# Patient Record
Sex: FEMALE | Race: WHITE | NOT HISPANIC OR LATINO | Employment: FULL TIME | URBAN - METROPOLITAN AREA
[De-identification: names, ages, dates, MRNs, and addresses within clinical notes are randomized per-mention and may not be internally consistent; named-entity substitution may affect disease eponyms.]

---

## 2017-10-13 ENCOUNTER — APPOINTMENT (OUTPATIENT)
Dept: RADIOLOGY | Facility: MEDICAL CENTER | Age: 37
End: 2017-10-13
Attending: EMERGENCY MEDICINE
Payer: COMMERCIAL

## 2017-10-13 ENCOUNTER — HOSPITAL ENCOUNTER (EMERGENCY)
Facility: MEDICAL CENTER | Age: 37
End: 2017-10-13
Attending: EMERGENCY MEDICINE
Payer: COMMERCIAL

## 2017-10-13 VITALS
HEART RATE: 88 BPM | HEIGHT: 66 IN | SYSTOLIC BLOOD PRESSURE: 122 MMHG | TEMPERATURE: 97.8 F | BODY MASS INDEX: 32.6 KG/M2 | DIASTOLIC BLOOD PRESSURE: 74 MMHG | OXYGEN SATURATION: 99 % | WEIGHT: 202.82 LBS | RESPIRATION RATE: 16 BRPM

## 2017-10-13 DIAGNOSIS — N83.201 CYST OF RIGHT OVARY: ICD-10-CM

## 2017-10-13 DIAGNOSIS — R10.31 RIGHT LOWER QUADRANT ABDOMINAL PAIN: ICD-10-CM

## 2017-10-13 LAB
ALBUMIN SERPL BCP-MCNC: 3.4 G/DL (ref 3.2–4.9)
ALBUMIN/GLOB SERPL: 1.7 G/DL
ALP SERPL-CCNC: 54 U/L (ref 30–99)
ALT SERPL-CCNC: 16 U/L (ref 2–50)
ANION GAP SERPL CALC-SCNC: 8 MMOL/L (ref 0–11.9)
APPEARANCE UR: CLEAR
AST SERPL-CCNC: 14 U/L (ref 12–45)
BASOPHILS # BLD AUTO: 0.3 % (ref 0–1.8)
BASOPHILS # BLD: 0.03 K/UL (ref 0–0.12)
BILIRUB SERPL-MCNC: 0.4 MG/DL (ref 0.1–1.5)
BILIRUB UR QL STRIP.AUTO: NEGATIVE
BUN SERPL-MCNC: 8 MG/DL (ref 8–22)
CALCIUM SERPL-MCNC: 8.5 MG/DL (ref 8.5–10.5)
CHLORIDE SERPL-SCNC: 108 MMOL/L (ref 96–112)
CO2 SERPL-SCNC: 21 MMOL/L (ref 20–33)
COLOR UR: YELLOW
CREAT SERPL-MCNC: 0.75 MG/DL (ref 0.5–1.4)
CULTURE IF INDICATED INDCX: NO UA CULTURE
EOSINOPHIL # BLD AUTO: 0.18 K/UL (ref 0–0.51)
EOSINOPHIL NFR BLD: 1.9 % (ref 0–6.9)
ERYTHROCYTE [DISTWIDTH] IN BLOOD BY AUTOMATED COUNT: 39.5 FL (ref 35.9–50)
GFR SERPL CREATININE-BSD FRML MDRD: >60 ML/MIN/1.73 M 2
GLOBULIN SER CALC-MCNC: 2 G/DL (ref 1.9–3.5)
GLUCOSE SERPL-MCNC: 86 MG/DL (ref 65–99)
GLUCOSE UR STRIP.AUTO-MCNC: NEGATIVE MG/DL
HCG SERPL QL: NEGATIVE
HCG UR QL: NEGATIVE
HCT VFR BLD AUTO: 47.5 % (ref 37–47)
HGB BLD-MCNC: 16.7 G/DL (ref 12–16)
IMM GRANULOCYTES # BLD AUTO: 0.04 K/UL (ref 0–0.11)
IMM GRANULOCYTES NFR BLD AUTO: 0.4 % (ref 0–0.9)
KETONES UR STRIP.AUTO-MCNC: ABNORMAL MG/DL
LEUKOCYTE ESTERASE UR QL STRIP.AUTO: NEGATIVE
LIPASE SERPL-CCNC: 59 U/L (ref 11–82)
LYMPHOCYTES # BLD AUTO: 2.41 K/UL (ref 1–4.8)
LYMPHOCYTES NFR BLD: 25.1 % (ref 22–41)
MCH RBC QN AUTO: 31.3 PG (ref 27–33)
MCHC RBC AUTO-ENTMCNC: 35.2 G/DL (ref 33.6–35)
MCV RBC AUTO: 89 FL (ref 81.4–97.8)
MICRO URNS: ABNORMAL
MONOCYTES # BLD AUTO: 0.61 K/UL (ref 0–0.85)
MONOCYTES NFR BLD AUTO: 6.3 % (ref 0–13.4)
NEUTROPHILS # BLD AUTO: 6.35 K/UL (ref 2–7.15)
NEUTROPHILS NFR BLD: 66 % (ref 44–72)
NITRITE UR QL STRIP.AUTO: NEGATIVE
NRBC # BLD AUTO: 0 K/UL
NRBC BLD AUTO-RTO: 0 /100 WBC
PH UR STRIP.AUTO: 5.5 [PH]
PLATELET # BLD AUTO: 209 K/UL (ref 164–446)
PMV BLD AUTO: 12.3 FL (ref 9–12.9)
POTASSIUM SERPL-SCNC: 3.7 MMOL/L (ref 3.6–5.5)
PROT SERPL-MCNC: 5.4 G/DL (ref 6–8.2)
PROT UR QL STRIP: NEGATIVE MG/DL
RBC # BLD AUTO: 5.34 M/UL (ref 4.2–5.4)
RBC UR QL AUTO: NEGATIVE
SODIUM SERPL-SCNC: 137 MMOL/L (ref 135–145)
SP GR UR REFRACTOMETRY: 1.01
SP GR UR STRIP.AUTO: 1.01
UROBILINOGEN UR STRIP.AUTO-MCNC: 0.2 MG/DL
WBC # BLD AUTO: 9.6 K/UL (ref 4.8–10.8)

## 2017-10-13 PROCEDURE — 84703 CHORIONIC GONADOTROPIN ASSAY: CPT

## 2017-10-13 PROCEDURE — 80053 COMPREHEN METABOLIC PANEL: CPT

## 2017-10-13 PROCEDURE — 74177 CT ABD & PELVIS W/CONTRAST: CPT

## 2017-10-13 PROCEDURE — 85025 COMPLETE CBC W/AUTO DIFF WBC: CPT

## 2017-10-13 PROCEDURE — 99284 EMERGENCY DEPT VISIT MOD MDM: CPT

## 2017-10-13 PROCEDURE — 81003 URINALYSIS AUTO W/O SCOPE: CPT

## 2017-10-13 PROCEDURE — 81025 URINE PREGNANCY TEST: CPT

## 2017-10-13 PROCEDURE — 83690 ASSAY OF LIPASE: CPT

## 2017-10-13 PROCEDURE — 700117 HCHG RX CONTRAST REV CODE 255: Performed by: EMERGENCY MEDICINE

## 2017-10-13 RX ADMIN — IOHEXOL 100 ML: 350 INJECTION, SOLUTION INTRAVENOUS at 14:29

## 2017-10-13 NOTE — DISCHARGE INSTRUCTIONS
"Ovarian Cyst  An ovarian cyst is a fluid-filled sac that forms on an ovary. The ovaries are small organs that produce eggs in women. Various types of cysts can form on the ovaries. Most are not cancerous. Many do not cause problems, and they often go away on their own. Some may cause symptoms and require treatment. Common types of ovarian cysts include:  · Functional cysts--These cysts may occur every month during the menstrual cycle. This is normal. The cysts usually go away with the next menstrual cycle if the woman does not get pregnant. Usually, there are no symptoms with a functional cyst.  · Endometrioma cysts--These cysts form from the tissue that lines the uterus. They are also called \"chocolate cysts\" because they become filled with blood that turns brown. This type of cyst can cause pain in the lower abdomen during intercourse and with your menstrual period.  · Cystadenoma cysts--This type develops from the cells on the outside of the ovary. These cysts can get very big and cause lower abdomen pain and pain with intercourse. This type of cyst can twist on itself, cut off its blood supply, and cause severe pain. It can also easily rupture and cause a lot of pain.  · Dermoid cysts--This type of cyst is sometimes found in both ovaries. These cysts may contain different kinds of body tissue, such as skin, teeth, hair, or cartilage. They usually do not cause symptoms unless they get very big.  · Theca lutein cysts--These cysts occur when too much of a certain hormone (human chorionic gonadotropin) is produced and overstimulates the ovaries to produce an egg. This is most common after procedures used to assist with the conception of a baby (in vitro fertilization).  CAUSES   · Fertility drugs can cause a condition in which multiple large cysts are formed on the ovaries. This is called ovarian hyperstimulation syndrome.  · A condition called polycystic ovary syndrome can cause hormonal imbalances that can lead to " nonfunctional ovarian cysts.  SIGNS AND SYMPTOMS   Many ovarian cysts do not cause symptoms. If symptoms are present, they may include:  · Pelvic pain or pressure.  · Pain in the lower abdomen.  · Pain during sexual intercourse.  · Increasing girth (swelling) of the abdomen.  · Abnormal menstrual periods.  · Increasing pain with menstrual periods.  · Stopping having menstrual periods without being pregnant.  DIAGNOSIS   These cysts are commonly found during a routine or annual pelvic exam. Tests may be ordered to find out more about the cyst. These tests may include:  · Ultrasound.  · X-ray of the pelvis.  · CT scan.  · MRI.  · Blood tests.  TREATMENT   Many ovarian cysts go away on their own without treatment. Your health care provider may want to check your cyst regularly for 2-3 months to see if it changes. For women in menopause, it is particularly important to monitor a cyst closely because of the higher rate of ovarian cancer in menopausal women. When treatment is needed, it may include any of the following:  · A procedure to drain the cyst (aspiration). This may be done using a long needle and ultrasound. It can also be done through a laparoscopic procedure. This involves using a thin, lighted tube with a tiny camera on the end (laparoscope) inserted through a small incision.  · Surgery to remove the whole cyst. This may be done using laparoscopic surgery or an open surgery involving a larger incision in the lower abdomen.  · Hormone treatment or birth control pills. These methods are sometimes used to help dissolve a cyst.  HOME CARE INSTRUCTIONS   · Only take over-the-counter or prescription medicines as directed by your health care provider.  · Follow up with your health care provider as directed.  · Get regular pelvic exams and Pap tests.  SEEK MEDICAL CARE IF:   · Your periods are late, irregular, or painful, or they stop.  · Your pelvic pain or abdominal pain does not go away.  · Your abdomen becomes  larger or swollen.  · You have pressure on your bladder or trouble emptying your bladder completely.  · You have pain during sexual intercourse.  · You have feelings of fullness, pressure, or discomfort in your stomach.  · You lose weight for no apparent reason.  · You feel generally ill.  · You become constipated.  · You lose your appetite.  · You develop acne.  · You have an increase in body and facial hair.  · You are gaining weight, without changing your exercise and eating habits.  · You think you are pregnant.  SEEK IMMEDIATE MEDICAL CARE IF:   · You have increasing abdominal pain.  · You feel sick to your stomach (nauseous), and you throw up (vomit).  · You develop a fever that comes on suddenly.  · You have abdominal pain during a bowel movement.  · Your menstrual periods become heavier than usual.  MAKE SURE YOU:  · Understand these instructions.  · Will watch your condition.  · Will get help right away if you are not doing well or get worse.     This information is not intended to replace advice given to you by your health care provider. Make sure you discuss any questions you have with your health care provider.     Document Released: 12/18/2006 Document Revised: 12/23/2014 Document Reviewed: 08/25/2014  [employer] Interactive Patient Education ©2016 [employer]

## 2017-10-13 NOTE — ED NOTES
Chief Complaint   Patient presents with   • Abdominal Pain     started wednesday afternoon   • Nausea     36 y/o female ambulate to triage   Pt denies pregnancy denies dysuria

## 2017-10-13 NOTE — ED PROVIDER NOTES
"ED Provider Note    Scribed for Dr. Traci Briceno M.D. by Tacho Downey. 10/13/2017, 11:01 AM.    Primary care provider: SENAIT Rivera  Means of arrival: Walk-in  History obtained from: Patient  History limited by: None    CHIEF COMPLAINT  Chief Complaint   Patient presents with   • Abdominal Pain     started wednesday afternoon   • Nausea       HPI  Alexsandra Mims is a 37 y.o. female who presents to the Emergency Department due to waxing and waning right lower abdominal pain, that occasional radiates to her right lower back, onset Wednesday afternoon. The patient states that it initially \"felt like cramps\" but the pain has been progressively worsening. Per patient, standing up, coughing, and sneezing exacerbates her pain. She states that when she coughed it \"felt like someone trying to murder [her] from the inside\". Currently, her pain is a 4 or 5/10 in severity. The patient does not report any alleviating factors. Denies pain with bending her legs or palpation. She has had associated symptoms of a loss of appetite but denies nausea, vomiting, diarrhea, fever, dysuria, difficulties urinating, or vaginal discharge. Her LMP was on 9/30/17.     REVIEW OF SYSTEMS  Pertinent positives include right lower quadrant pain, right lower back pain, and a loss of appetite. Pertinent negatives include no  nausea, vomiting, diarrhea, fever, dysuria, difficulties urinating, or vaginal discharge. All other systems reviewed and negative.  C    PAST MEDICAL HISTORY   has a past medical history of Asthma and Tobacco use.    SURGICAL HISTORY   has a past surgical history that includes primary c section.    SOCIAL HISTORY  Social History   Substance Use Topics   • Smoking status: Former Smoker     Packs/day: 1.00     Years: 20.00     Types: Cigarettes   • Smokeless tobacco: Never Used   • Alcohol use 0.0 oz/week      Comment: RARELY      History   Drug Use No       FAMILY HISTORY  Family History   Problem Relation Age " "of Onset   • Heart Disease Mother    • Heart Attack Father    • Hypertension Sister    • Hypertension Brother        CURRENT MEDICATIONS  No current facility-administered medications on file prior to encounter.      Current Outpatient Prescriptions on File Prior to Encounter   Medication Sig Dispense Refill   • ibuprofen (MOTRIN) 800 MG Tab Take 1 Tab by mouth every 8 hours as needed for Moderate Pain. 50 Tab 0   • ondansetron (ZOFRAN) 4 MG Tab tablet Take 1 Tab by mouth every 6 hours as needed for Nausea/Vomiting. 8 Each 0   • metronidazole (FLAGYL) 500 MG Tab Take 500 mg by mouth every 6 hours.     • ibuprofen (MOTRIN) 800 MG Tab Take 1 Tab by mouth every 8 hours as needed for Moderate Pain. 40 Tab 0   • tramadol (ULTRAM) 50 MG Tab Take 1 Tab by mouth every 6 hours as needed for Moderate Pain. 24 Tab 0   • albuterol (PROAIR HFA) 108 (90 BASE) MCG/ACT Aero Soln inhalation aerosol 2 Puffs by Nebulization route.         ALLERGIES  Allergies   Allergen Reactions   • Amoxicillin    • Clindamycin Rash     Rash \"like poison ivy on my arms\"   • Darvocet [Propoxyphene N-Apap]    • Morphine Rash   • Norco [Hydrocodone-Acetaminophen]    • Pcn [Penicillins]      All cillins   • Sulfa Drugs    • Valium Swelling       PHYSICAL EXAM  VITAL SIGNS: /74   Pulse 99   Temp 36.6 °C (97.8 °F) (Temporal)   Resp 16   Ht 1.676 m (5' 6\")   Wt 92 kg (202 lb 13.2 oz)   SpO2 97%   BMI 32.74 kg/m²   Constitutional: Alert in no apparent distress.  HENT: No signs of trauma, Bilateral external ears normal, Nose normal.   Eyes: Pupils are equal and reactive, Conjunctiva normal, Non-icteric.   Neck: Normal range of motion, No tenderness, Supple, No stridor.   Cardiovascular: Regular rate and rhythm, no murmurs.   Thorax & Lungs: Normal breath sounds, No respiratory distress, No wheezing, No chest tenderness.   Abdomen: Mild right upper quadrant tenderness with deep inspiration but no Mcburney's point tenderness, Bowel sounds normal, " Soft, No masses, No peritoneal signs.  Skin: Warm, Dry, No erythema, No rash.   Back: No bony tenderness, No CVA tenderness.   Musculoskeletal:  No major deformities noted.   Neurologic: Alert, moving all extremities without difficulty, no focal deficits.      LABS  Results for orders placed or performed during the hospital encounter of 10/13/17   URINALYSIS,CULTURE IF INDICATED   Result Value Ref Range    Color Yellow     Character Clear     Specific Gravity 1.007 <1.035    Ph 5.5 5.0 - 8.0    Glucose Negative Negative mg/dL    Ketones Trace (A) Negative mg/dL    Protein Negative Negative mg/dL    Bilirubin Negative Negative    Urobilinogen, Urine 0.2 Negative    Nitrite Negative Negative    Leukocyte Esterase Negative Negative    Occult Blood Negative Negative    Micro Urine Req see below     Culture Indicated No UA Culture   BETA-HCG QUALITATIVE URINE   Result Value Ref Range    Beta-Hcg Urine Negative Negative   REFRACTOMETER SG   Result Value Ref Range    Specific Gravity 1.007    CBC WITH DIFFERENTIAL   Result Value Ref Range    WBC 9.6 4.8 - 10.8 K/uL    RBC 5.34 4.20 - 5.40 M/uL    Hemoglobin 16.7 (H) 12.0 - 16.0 g/dL    Hematocrit 47.5 (H) 37.0 - 47.0 %    MCV 89.0 81.4 - 97.8 fL    MCH 31.3 27.0 - 33.0 pg    MCHC 35.2 (H) 33.6 - 35.0 g/dL    RDW 39.5 35.9 - 50.0 fL    Platelet Count 209 164 - 446 K/uL    MPV 12.3 9.0 - 12.9 fL    Neutrophils-Polys 66.00 44.00 - 72.00 %    Lymphocytes 25.10 22.00 - 41.00 %    Monocytes 6.30 0.00 - 13.40 %    Eosinophils 1.90 0.00 - 6.90 %    Basophils 0.30 0.00 - 1.80 %    Immature Granulocytes 0.40 0.00 - 0.90 %    Nucleated RBC 0.00 /100 WBC    Neutrophils (Absolute) 6.35 2.00 - 7.15 K/uL    Lymphs (Absolute) 2.41 1.00 - 4.80 K/uL    Monos (Absolute) 0.61 0.00 - 0.85 K/uL    Eos (Absolute) 0.18 0.00 - 0.51 K/uL    Baso (Absolute) 0.03 0.00 - 0.12 K/uL    Immature Granulocytes (abs) 0.04 0.00 - 0.11 K/uL    NRBC (Absolute) 0.00 K/uL   COMP METABOLIC PANEL   Result Value  Ref Range    Sodium 137 135 - 145 mmol/L    Potassium 3.7 3.6 - 5.5 mmol/L    Chloride 108 96 - 112 mmol/L    Co2 21 20 - 33 mmol/L    Anion Gap 8.0 0.0 - 11.9    Glucose 86 65 - 99 mg/dL    Bun 8 8 - 22 mg/dL    Creatinine 0.75 0.50 - 1.40 mg/dL    Calcium 8.5 8.5 - 10.5 mg/dL    AST(SGOT) 14 12 - 45 U/L    ALT(SGPT) 16 2 - 50 U/L    Alkaline Phosphatase 54 30 - 99 U/L    Total Bilirubin 0.4 0.1 - 1.5 mg/dL    Albumin 3.4 3.2 - 4.9 g/dL    Total Protein 5.4 (L) 6.0 - 8.2 g/dL    Globulin 2.0 1.9 - 3.5 g/dL    A-G Ratio 1.7 g/dL   LIPASE   Result Value Ref Range    Lipase 59 11 - 82 U/L   HCG QUAL SERUM   Result Value Ref Range    Beta-Hcg Qualitative Serum Negative Negative   ESTIMATED GFR   Result Value Ref Range    GFR If African American >60 >60 mL/min/1.73 m 2    GFR If Non African American >60 >60 mL/min/1.73 m 2     All labs reviewed by me.    RADIOLOGY  CT-ABDOMEN-PELVIS WITH   Final Result      1.  Normal-appearing appendix. No acute abnormality of the abdomen or pelvis.   2.  2.6 cm right ovarian cyst.        The radiologist's interpretation of all radiological studies have been reviewed by me.    COURSE & MEDICAL DECISION MAKING  Pertinent Labs & Imaging studies reviewed. (See chart for details)    Differential diagnoses include but are not limited to: kidney stone, urinary tract infection, appendicitis, and biliary colic, ovarian cyst.     11:01 AM - Patient seen and examined at bedside. Ordered urinalysis, beta-HCG qualitative urine, refractometer SG, lipase, CMP, and CBC with differential to evaluate her symptoms. I explained that since she is not tender in her right lower quadrant, I am Less concerned about acute appendicitis. However laboratory and imaging will be performed to evaluate.    11:42 AM- Ordered estimated GFR to further evaluate.     12:40 PM- Reviewed labs, which are overall unremarkable.     12:44 PM- Ordered CT abdomen/pelvis     12:45 PM- Recheck: patient is resting in bed  "comfortably. I explained that the patient's labs are overall unremarkable. She has no elevated white blood cell count or left shift concerning for intra-abdominal abscess. However given this patient does feel something significantly different I am concerned for intra-abdominal process. Thus, we will order a CT scan to further investigate. Patient understands and agrees.     2:55 PM- Reviewed CT scan, which showed a 2.6 cm right ovarian cyst.     3:04 PM - Re-examined; The patient is resting in bed comfortably. I discussed her above findings and plans for discharge. She was instructed to return to the ED for worsening pain, vomiting, fever, or other concerns. Patient understands and agrees. Her vitals prior to discharge are: /74   Pulse 88   Temp 36.6 °C (97.8 °F) (Temporal)   Resp 16   Ht 1.676 m (5' 6\")   Wt 92 kg (202 lb 13.2 oz)   SpO2 99%   BMI 32.74 kg/m²       The patient will return for new or worsening symptoms and is stable at the time of discharge. Patient was given return precautions. Patient and/or family member verbalizes understanding and will comply.    The patient is referred to a primary physician for blood pressure management, diabetic screening, and for all other preventive health concerns.    DISPOSITION:  Patient will be discharged home in stable condition.    FOLLOW UP:  SENAIT Rivera  [address] [place] [address] [phone]    Schedule an appointment as soon as possible for a visit      [facility], Emergency Dept  [address] [address] [phone]    Worsening pain, vomiting, fever, or other concerns    Robinson Swartz M.D.  [address] [place] [address] [address]  [facility] [address]  [phone]      GYN follow up      OUTPATIENT MEDICATIONS:  Discharge Medication List as of 10/13/2017  3:08 PM          FINAL IMPRESSION  1. Cyst of right ovary    2. Right lower quadrant abdominal pain         This dictation has been created using voice " recognition software and/or scribes. The accuracy of the dictation is limited by the abilities of the software and the expertise of the scribes. I expect there may be some errors of grammar and possibly content. I made every attempt to manually correct the errors within my dictation. However, errors related to voice recognition software and/or scribes may still exist and should be interpreted within the appropriate context.     I, Tacho Downey (Scribe), am scribing for, and in the presence of, Traci Briceno M.D..    Electronically signed by: Tacho Downey (Scribe), 10/13/2017    I, Traci Briceno M.D. personally performed the services described in this documentation, as scribed by Tacho Downey in my presence, and it is both accurate and complete.      The note accurately reflects work and decisions made by me.  Traci Briceno  10/13/2017  6:31 PM

## 2017-10-14 ENCOUNTER — PATIENT OUTREACH (OUTPATIENT)
Dept: HEALTH INFORMATION MANAGEMENT | Facility: OTHER | Age: 37
End: 2017-10-14

## 2018-09-04 ENCOUNTER — HOSPITAL ENCOUNTER (EMERGENCY)
Facility: MEDICAL CENTER | Age: 38
End: 2018-09-04
Attending: EMERGENCY MEDICINE
Payer: COMMERCIAL

## 2018-09-04 ENCOUNTER — APPOINTMENT (OUTPATIENT)
Dept: RADIOLOGY | Facility: MEDICAL CENTER | Age: 38
End: 2018-09-04
Attending: EMERGENCY MEDICINE
Payer: COMMERCIAL

## 2018-09-04 VITALS
WEIGHT: 210.54 LBS | RESPIRATION RATE: 18 BRPM | OXYGEN SATURATION: 95 % | HEART RATE: 60 BPM | SYSTOLIC BLOOD PRESSURE: 122 MMHG | BODY MASS INDEX: 33.84 KG/M2 | HEIGHT: 66 IN | DIASTOLIC BLOOD PRESSURE: 79 MMHG | TEMPERATURE: 97.8 F
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
## 2018-09-04 DIAGNOSIS — S16.1XXA STRAIN OF NECK MUSCLE, INITIAL ENCOUNTER: ICD-10-CM

## 2018-09-04 DIAGNOSIS — S00.83XA CONTUSION OF FACE, INITIAL ENCOUNTER: ICD-10-CM

## 2018-09-04 PROCEDURE — A9270 NON-COVERED ITEM OR SERVICE: HCPCS | Mod: EDC | Performed by: EMERGENCY MEDICINE

## 2018-09-04 PROCEDURE — 72125 CT NECK SPINE W/O DYE: CPT

## 2018-09-04 PROCEDURE — 700102 HCHG RX REV CODE 250 W/ 637 OVERRIDE(OP): Mod: EDC | Performed by: EMERGENCY MEDICINE

## 2018-09-04 PROCEDURE — 99284 EMERGENCY DEPT VISIT MOD MDM: CPT | Mod: EDC

## 2018-09-04 PROCEDURE — 70486 CT MAXILLOFACIAL W/O DYE: CPT

## 2018-09-04 PROCEDURE — 70450 CT HEAD/BRAIN W/O DYE: CPT

## 2018-09-04 RX ORDER — IBUPROFEN 600 MG/1
600 TABLET ORAL EVERY 6 HOURS PRN
Qty: 20 TAB | Refills: 0 | Status: SHIPPED | OUTPATIENT
Start: 2018-09-04 | End: 2018-11-06

## 2018-09-04 RX ORDER — ACETAMINOPHEN 325 MG/1
650 TABLET ORAL ONCE
Status: COMPLETED | OUTPATIENT
Start: 2018-09-04 | End: 2018-09-04

## 2018-09-04 RX ORDER — IBUPROFEN 600 MG/1
600 TABLET ORAL ONCE
Status: COMPLETED | OUTPATIENT
Start: 2018-09-04 | End: 2018-09-04

## 2018-09-04 RX ADMIN — IBUPROFEN 600 MG: 600 TABLET, FILM COATED ORAL at 12:21

## 2018-09-04 RX ADMIN — ACETAMINOPHEN 650 MG: 325 TABLET, FILM COATED ORAL at 12:21
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
## 2018-11-06 ENCOUNTER — OFFICE VISIT (OUTPATIENT)
Dept: INTERNAL MEDICINE | Facility: MEDICAL CENTER | Age: 38
End: 2018-11-06
Payer: COMMERCIAL

## 2018-11-06 VITALS
OXYGEN SATURATION: 94 % | BODY MASS INDEX: 32.07 KG/M2 | SYSTOLIC BLOOD PRESSURE: 114 MMHG | WEIGHT: 211.6 LBS | DIASTOLIC BLOOD PRESSURE: 62 MMHG | HEIGHT: 68 IN | TEMPERATURE: 98.3 F | HEART RATE: 64 BPM

## 2018-11-06 DIAGNOSIS — N92.1 MENOMETRORRHAGIA: ICD-10-CM

## 2018-11-06 DIAGNOSIS — E66.9 OBESITY (BMI 30-39.9): ICD-10-CM

## 2018-11-06 DIAGNOSIS — Z72.0 TOBACCO USE: ICD-10-CM

## 2018-11-06 DIAGNOSIS — J45.30 MILD PERSISTENT ASTHMA WITHOUT COMPLICATION: ICD-10-CM

## 2018-11-06 DIAGNOSIS — N83.201 CYST OF RIGHT OVARY: ICD-10-CM

## 2018-11-06 DIAGNOSIS — R87.619 ABNORMAL CERVICAL PAPANICOLAOU SMEAR, UNSPECIFIED ABNORMAL PAP FINDING: ICD-10-CM

## 2018-11-06 PROCEDURE — 99204 OFFICE O/P NEW MOD 45 MIN: CPT | Mod: GC | Performed by: INTERNAL MEDICINE

## 2018-11-06 RX ORDER — ALBUTEROL SULFATE 90 UG/1
2 AEROSOL, METERED RESPIRATORY (INHALATION) EVERY 6 HOURS PRN
Qty: 8.5 G | Refills: 0 | Status: SHIPPED | OUTPATIENT
Start: 2018-11-06 | End: 2020-02-11

## 2018-11-06 ASSESSMENT — PATIENT HEALTH QUESTIONNAIRE - PHQ9: CLINICAL INTERPRETATION OF PHQ2 SCORE: 0

## 2018-11-07 NOTE — PROGRESS NOTES
New Patient to Establish    Reason to establish: New patient to establish    CC:   Abdominal pain  History of abnormal Pap smear      HPI:   38-year-old female with a past medical history of right ovarian simple cyst presented to the clinic to establish care.  She was diagnosed with ovarian cyst approximately 1 year ago.  Currently reports mild to moderate right flank pain, throbbing in nature, nonradiating, not aggravated by movement, alleviated with NSAIDs.  Denies any dysuria, hematuria, colicky nature, nausea, vomiting, fevers, or chills.  She does report menometrorrhagia and dyspareunia which have been progressively getting worse over the past 1-1/2 years.  She has a cycle of bleeding every 2 weeks.  Denies any spotting in between cycles.  She has had 2 C-sections.  Patient also reports significant abdominal pain and cramping during cycles.  Currently smokes half a pack a day.  Her  is a smoker as well.  She is tried quitting with Chantix before but relapsed due to social stressors.    Patient Active Problem List    Diagnosis Date Noted   • Tobacco use 11/06/2018   • Menometrorrhagia 11/06/2018   • Cyst of right ovary 11/06/2018   • Allergic rhinitis 11/16/2016   • Abnormal Pap smear of cervix 11/16/2016   • Smoker 08/26/2016   • Mild persistent asthma 08/26/2016   • Breast disorder 08/26/2016   • Encounter for surveillance of contraceptives 08/26/2016   • Mild intermittent asthma 05/23/2011       Past Medical History:   Diagnosis Date   • Asthma    • Tobacco use        Current Outpatient Prescriptions   Medication Sig Dispense Refill   • albuterol 108 (90 Base) MCG/ACT Aero Soln inhalation aerosol Inhale 2 Puffs by mouth every 6 hours as needed for Shortness of Breath. 8.5 g 0     No current facility-administered medications for this visit.        Allergies as of 11/06/2018 - Reviewed 11/06/2018   Allergen Reaction Noted   • Amoxicillin  08/26/2016   • Clindamycin Rash 12/23/2016   • Darvocet  "[propoxyphene n-apap]  08/26/2016   • Morphine Rash 08/26/2016   • Norco [hydrocodone-acetaminophen]  08/26/2016   • Pcn [penicillins]  08/26/2016   • Sulfa drugs  08/26/2016   • Valium Swelling 08/26/2016       Social History     Social History   • Marital status:      Spouse name: N/A   • Number of children: N/A   • Years of education: N/A     Occupational History   • Not on file.     Social History Main Topics   • Smoking status: Former Smoker     Packs/day: 0.50     Years: 20.00     Types: Cigarettes   • Smokeless tobacco: Never Used   • Alcohol use 0.0 oz/week      Comment: RARELY   • Drug use: No   • Sexual activity: Yes     Partners: Male     Other Topics Concern   • Not on file     Social History Narrative   • No narrative on file       Family History   Problem Relation Age of Onset   • Heart Disease Mother    • Heart Attack Father    • Hypertension Sister    • Hypertension Brother        Past Surgical History:   Procedure Laterality Date   • PRIMARY C SECTION         ROS: As per HPI. Additional pertinent systems as noted below.    REVIEW OF SYSTEMS:   CONSTITUTIONAL: Denies malaise    HEENT: Denies sore throat  CARDIOVASCULAR: Denies chest pain or palpitations   RESPIRATORY: No cough, shortness of breath  GASTROINTESTINAL: No nausea or vomiting   MUSCULOSKELETAL: No joint pain   SKIN: No change in skin, hair or nails.   NEUROLOGIC: No focal weakness   PSYCHIATRIC: Denies mood disturbances or insomnia    HEMATOLOGICAL: No easy bruising or bleeding.         /62 (BP Location: Left arm, Patient Position: Sitting, BP Cuff Size: Large adult)   Pulse 64   Temp 36.8 °C (98.3 °F) (Temporal)   Ht 1.727 m (5' 8\")   Wt 96 kg (211 lb 9.6 oz)   SpO2 94%   BMI 32.17 kg/m²     Physical Exam  General:  Alert and oriented, No apparent distress.  Obese    Eyes: Pupils equal and reactive. No scleral icterus.    Throat: Clear no erythema or exudates noted.    Neck: Supple. No lymphadenopathy noted. Thyroid " not enlarged.    Lungs: Clear to auscultation and percussion bilaterally.    Cardiovascular: Regular rate and rhythm. No murmurs, rubs or gallops.    Abdomen:  Benign. No rebound or guarding noted.  Negative for flank tenderness.  Mild abdominal pain to deep palpation in the right lower quadrant.  No peritoneal signs present.    Musculoskeletal: Negative for spinal or paraspinal tenderness.    Extremities: No clubbing, cyanosis, edema.    Skin: Clear. No rash or suspicious skin lesions noted.        Note: I have reviewed all pertinent labs and diagnostic tests associated with this visit with specific comments listed under the assessment and plan below    Assessment and Plan    1. Menometrorrhagia  3. Cyst of right ovary  5. Abnormal cervical Papanicolaou smear, unspecified abnormal pap finding  -Gynecology consult.  Gynecology to decide imaging modalities.  -CBC  -Patient currently refusing Pap smear.  Requesting Pap smear to be performed at gynecology visit.  -Return to clinic in 5 weeks      2. Tobacco use  -Extensive counseling regarding coronary artery disease, strokes, and cancer is provided to the patient.  -Advised to form a quit date  -Requested over-the-counter nicotine patches and lozenges to help with nicotine cravings with cessation.        4. Mild persistent asthma without complication  -Occasional symptoms.  Denies any nighttime symptoms  -Albuterol and is needed      6. Obesity (BMI 30-39.9)  -Counseling regarding obesity related illnesses provided to the patient  -Serum TSH and lipid panel  -Advised to increase aerobic exercise 30-45 minutes a day for 3 times a week.  Advised to stop all added sugars and diet.      Followup: Return in about 5 weeks (around 12/11/2018).  Signed by: Aaron Jensen M.D.

## 2019-01-31 ENCOUNTER — OFFICE VISIT (OUTPATIENT)
Dept: URGENT CARE | Facility: PHYSICIAN GROUP | Age: 39
End: 2019-01-31
Payer: COMMERCIAL

## 2019-01-31 ENCOUNTER — APPOINTMENT (OUTPATIENT)
Dept: URGENT CARE | Facility: PHYSICIAN GROUP | Age: 39
End: 2019-01-31
Payer: COMMERCIAL

## 2019-01-31 ENCOUNTER — HOSPITAL ENCOUNTER (OUTPATIENT)
Dept: RADIOLOGY | Facility: MEDICAL CENTER | Age: 39
End: 2019-01-31
Attending: FAMILY MEDICINE
Payer: COMMERCIAL

## 2019-01-31 VITALS
RESPIRATION RATE: 14 BRPM | OXYGEN SATURATION: 97 % | HEIGHT: 66 IN | DIASTOLIC BLOOD PRESSURE: 80 MMHG | BODY MASS INDEX: 32.14 KG/M2 | TEMPERATURE: 98.1 F | WEIGHT: 200 LBS | SYSTOLIC BLOOD PRESSURE: 120 MMHG | HEART RATE: 78 BPM

## 2019-01-31 DIAGNOSIS — M79.672 LEFT FOOT PAIN: ICD-10-CM

## 2019-01-31 PROCEDURE — 73630 X-RAY EXAM OF FOOT: CPT | Mod: LT

## 2019-01-31 PROCEDURE — 99203 OFFICE O/P NEW LOW 30 MIN: CPT | Performed by: FAMILY MEDICINE

## 2019-01-31 ASSESSMENT — ENCOUNTER SYMPTOMS
NUMBNESS: 0
CHILLS: 0
FEVER: 0
NAUSEA: 0
WEAKNESS: 0
VOMITING: 0
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
## 2020-02-11 ENCOUNTER — HOSPITAL ENCOUNTER (EMERGENCY)
Facility: MEDICAL CENTER | Age: 40
End: 2020-02-11
Attending: EMERGENCY MEDICINE
Payer: COMMERCIAL

## 2020-02-11 ENCOUNTER — APPOINTMENT (OUTPATIENT)
Dept: RADIOLOGY | Facility: MEDICAL CENTER | Age: 40
End: 2020-02-11
Attending: EMERGENCY MEDICINE
Payer: COMMERCIAL

## 2020-02-11 VITALS
SYSTOLIC BLOOD PRESSURE: 113 MMHG | HEART RATE: 70 BPM | OXYGEN SATURATION: 97 % | BODY MASS INDEX: 33.22 KG/M2 | HEIGHT: 67 IN | DIASTOLIC BLOOD PRESSURE: 59 MMHG | WEIGHT: 211.64 LBS | RESPIRATION RATE: 16 BRPM | TEMPERATURE: 99.1 F

## 2020-02-11 DIAGNOSIS — N83.201 CYST OF RIGHT OVARY: ICD-10-CM

## 2020-02-11 DIAGNOSIS — O20.9 BLEEDING IN EARLY PREGNANCY: ICD-10-CM

## 2020-02-11 LAB
APPEARANCE UR: CLEAR
B-HCG SERPL-ACNC: 9130 MIU/ML (ref 0–10)
BACTERIA #/AREA URNS HPF: ABNORMAL /HPF
BASOPHILS # BLD AUTO: 0.3 % (ref 0–1.8)
BASOPHILS # BLD: 0.03 K/UL (ref 0–0.12)
BILIRUB UR QL STRIP.AUTO: NEGATIVE
COLOR UR: YELLOW
EOSINOPHIL # BLD AUTO: 0.16 K/UL (ref 0–0.51)
EOSINOPHIL NFR BLD: 1.6 % (ref 0–6.9)
EPI CELLS #/AREA URNS HPF: ABNORMAL /HPF
ERYTHROCYTE [DISTWIDTH] IN BLOOD BY AUTOMATED COUNT: 39 FL (ref 35.9–50)
GLUCOSE UR STRIP.AUTO-MCNC: NEGATIVE MG/DL
HCG UR QL: POSITIVE
HCT VFR BLD AUTO: 40.9 % (ref 37–47)
HGB BLD-MCNC: 14 G/DL (ref 12–16)
IMM GRANULOCYTES # BLD AUTO: 0.04 K/UL (ref 0–0.11)
IMM GRANULOCYTES NFR BLD AUTO: 0.4 % (ref 0–0.9)
KETONES UR STRIP.AUTO-MCNC: NEGATIVE MG/DL
LEUKOCYTE ESTERASE UR QL STRIP.AUTO: NEGATIVE
LYMPHOCYTES # BLD AUTO: 1.54 K/UL (ref 1–4.8)
LYMPHOCYTES NFR BLD: 15.2 % (ref 22–41)
MCH RBC QN AUTO: 31.4 PG (ref 27–33)
MCHC RBC AUTO-ENTMCNC: 34.2 G/DL (ref 33.6–35)
MCV RBC AUTO: 91.7 FL (ref 81.4–97.8)
MICRO URNS: ABNORMAL
MONOCYTES # BLD AUTO: 0.49 K/UL (ref 0–0.85)
MONOCYTES NFR BLD AUTO: 4.8 % (ref 0–13.4)
NEUTROPHILS # BLD AUTO: 7.89 K/UL (ref 2–7.15)
NEUTROPHILS NFR BLD: 77.7 % (ref 44–72)
NITRITE UR QL STRIP.AUTO: NEGATIVE
NRBC # BLD AUTO: 0 K/UL
NRBC BLD-RTO: 0 /100 WBC
NUMBER OF RH DOSES IND 8505RD: NORMAL
PH UR STRIP.AUTO: 6 [PH] (ref 5–8)
PLATELET # BLD AUTO: 253 K/UL (ref 164–446)
PMV BLD AUTO: 11.9 FL (ref 9–12.9)
PROT UR QL STRIP: NEGATIVE MG/DL
RBC # BLD AUTO: 4.46 M/UL (ref 4.2–5.4)
RBC # URNS HPF: ABNORMAL /HPF
RBC UR QL AUTO: ABNORMAL
RH BLD: NORMAL
SP GR UR STRIP.AUTO: <=1.005
WBC # BLD AUTO: 10.2 K/UL (ref 4.8–10.8)
WBC #/AREA URNS HPF: ABNORMAL /HPF

## 2020-02-11 PROCEDURE — 84702 CHORIONIC GONADOTROPIN TEST: CPT

## 2020-02-11 PROCEDURE — 85025 COMPLETE CBC W/AUTO DIFF WBC: CPT

## 2020-02-11 PROCEDURE — 76801 OB US < 14 WKS SINGLE FETUS: CPT

## 2020-02-11 PROCEDURE — 81025 URINE PREGNANCY TEST: CPT

## 2020-02-11 PROCEDURE — 81001 URINALYSIS AUTO W/SCOPE: CPT

## 2020-02-11 PROCEDURE — 36415 COLL VENOUS BLD VENIPUNCTURE: CPT

## 2020-02-11 PROCEDURE — 86901 BLOOD TYPING SEROLOGIC RH(D): CPT

## 2020-02-11 PROCEDURE — 99284 EMERGENCY DEPT VISIT MOD MDM: CPT

## 2020-02-11 RX ORDER — IBUPROFEN 800 MG/1
800 TABLET ORAL EVERY 8 HOURS PRN
Status: SHIPPED | COMMUNITY
End: 2021-01-04

## 2020-02-11 RX ORDER — HYDROCODONE BITARTRATE AND ACETAMINOPHEN 10; 325 MG/1; MG/1
1-2 TABLET ORAL EVERY 6 HOURS PRN
Status: SHIPPED | COMMUNITY
End: 2021-01-04

## 2020-02-11 RX ORDER — CLINDAMYCIN HYDROCHLORIDE 150 MG/1
150 CAPSULE ORAL 4 TIMES DAILY
Status: SHIPPED | COMMUNITY
Start: 2020-01-22 | End: 2021-01-04

## 2020-02-11 NOTE — ED PROVIDER NOTES
"ED Provider Note    CHIEF COMPLAINT  Chief Complaint   Patient presents with   • Pregnancy   • Cramping   • Spotting       HPI  Alexsandra Mims is a 40 y.o. G3, P2 Ab0 female with LMP 12/30/2019 who presents complaining of spotting and cramping.    Patient has taken a home pregnancy test which was positive.  She has not seen a provider for this pregnancy and has not had an ultrasound.  She states she had lower abdominal cramping that was fairly mild last night and began having spotting and increased cramping/pain today.  She has been taking ibuprofen and Norco for \"dry socket\" following oral surgery last week.  She admits to urinary frequency.  She believes she may have a urinary tract infection.  She denies flank pain, nausea, vomiting, fever, chills, syncope.      ALLERGIES  Allergies   Allergen Reactions   • Amoxicillin Shortness of Breath   • Darvocet [Propoxyphene N-Apap] Rash and Vomiting     .   • Morphine Rash   • Pcn [Penicillins] Shortness of Breath     All cillins   • Sulfa Drugs Shortness of Breath   • Valium Swelling       CURRENT MEDICATIONS  Home Medications     Reviewed by Letty Vu (Pharmacy Tech) on 02/11/20 at 1145  Med List Status: Complete   Medication Last Dose Status   clindamycin (CLEOCIN) 150 MG Cap 2/11/2020 Active   HYDROcodone/acetaminophen (NORCO)  MG Tab 2/10/2020 Active   ibuprofen (MOTRIN) 800 MG Tab 2/11/2020 Active   multivitamin (THERAGRAN) Tab 2/11/2020 Active                PAST MEDICAL HISTORY   has a past medical history of Asthma and Tobacco use.    SURGICAL HISTORY   has a past surgical history that includes primary c section.    SOCIAL HISTORY  Social History     Tobacco Use   • Smoking status: Former Smoker     Packs/day: 0.50     Years: 20.00     Pack years: 10.00     Types: Cigarettes   • Smokeless tobacco: Never Used   Substance and Sexual Activity   • Alcohol use: Yes     Alcohol/week: 0.0 oz     Comment: RARELY   • Drug use: No   • Sexual " "activity: Yes     Partners: Male       Family Hx:  Sister with ectopic pregnancies      REVIEW OF SYSTEMS  See HPI for further details.  All other systems are negative except as above in HPI.      PHYSICAL EXAM  VITAL SIGNS: /68   Pulse 74   Temp 36.7 °C (98.1 °F) (Temporal)   Resp 16   Ht 1.702 m (5' 7\")   Wt 96 kg (211 lb 10.3 oz)   LMP 12/30/2019   SpO2 97%   BMI 33.15 kg/m²     General:  WDWN, nontoxic appearing in NAD; A+Ox3; V/S as above   Skin: warm and dry; good color; no rash  HEENT: NCAT; EOMs intact; PERRL; no scleral icterus   Neck: FROM; soft  Cardiovascular: Regular heart rate and rhythm.  No murmurs, rubs, or gallops; pulses 2+ bilaterally radially   Lungs: Clear to auscultation with good air movement bilaterally.  No wheezes, rhonchi, or rales.   Abdomen: BS present; soft; NTND; no rebound, guarding, or rigidity.  No organomegaly or pulsatile mass; no CVAT   Extremities: SANDS x 4; no e/o trauma; no pedal edema; neg Erica's  Neurologic: CNs III-XII grossly intact; speech clear; distal sensation intact; strength 5/5 UE/LEs; gait steady  Psychiatric: Appropriate affect, normal mood    LABS  Results for orders placed or performed during the hospital encounter of 02/11/20   BETA-HCG QUALITATIVE URINE   Result Value Ref Range    Beta-Hcg Urine Positive (A) Negative   CBC WITH DIFFERENTIAL   Result Value Ref Range    WBC 10.2 4.8 - 10.8 K/uL    RBC 4.46 4.20 - 5.40 M/uL    Hemoglobin 14.0 12.0 - 16.0 g/dL    Hematocrit 40.9 37.0 - 47.0 %    MCV 91.7 81.4 - 97.8 fL    MCH 31.4 27.0 - 33.0 pg    MCHC 34.2 33.6 - 35.0 g/dL    RDW 39.0 35.9 - 50.0 fL    Platelet Count 253 164 - 446 K/uL    MPV 11.9 9.0 - 12.9 fL    Neutrophils-Polys 77.70 (H) 44.00 - 72.00 %    Lymphocytes 15.20 (L) 22.00 - 41.00 %    Monocytes 4.80 0.00 - 13.40 %    Eosinophils 1.60 0.00 - 6.90 %    Basophils 0.30 0.00 - 1.80 %    Immature Granulocytes 0.40 0.00 - 0.90 %    Nucleated RBC 0.00 /100 WBC    Neutrophils (Absolute) " 7.89 (H) 2.00 - 7.15 K/uL    Lymphs (Absolute) 1.54 1.00 - 4.80 K/uL    Monos (Absolute) 0.49 0.00 - 0.85 K/uL    Eos (Absolute) 0.16 0.00 - 0.51 K/uL    Baso (Absolute) 0.03 0.00 - 0.12 K/uL    Immature Granulocytes (abs) 0.04 0.00 - 0.11 K/uL    NRBC (Absolute) 0.00 K/uL   BETA-HCG QUANTITATIVE SERUM   Result Value Ref Range    Bhcg 9130.0 (H) 0.0 - 10.0 mIU/mL   RH TYPE FOR RHOGAM FROM E.D.   Result Value Ref Range    Emergency Department Rh Typing POS     Number Of Rh Doses Indicated ZERO    URINALYSIS   Result Value Ref Range    Color Yellow     Character Clear     Specific Gravity <=1.005 <1.035    Ph 6.0 5.0 - 8.0    Glucose Negative Negative mg/dL    Ketones Negative Negative mg/dL    Protein Negative Negative mg/dL    Bilirubin Negative Negative    Nitrite Negative Negative    Leukocyte Esterase Negative Negative    Occult Blood Large (A) Negative    Micro Urine Req Microscopic    URINE MICROSCOPIC (W/UA)   Result Value Ref Range    WBC 5-10 (A) /hpf    RBC 5-10 (A) /hpf    Bacteria Few (A) None /hpf    Epithelial Cells Few Few /hpf           IMAGING  US-OB 1ST TRIMESTER WITH TRANSVAGINAL (COMBO)   Final Result      1.  Single living intrauterine pregnancy at 6 weeks 1 day gestation by ultrasound.      2.  Subchorionic hemorrhage.      3.  Small right ovarian corpus luteum cyst.          MEDICAL RECORD  I have reviewed patient's medical record and pertinent results are listed below.      COURSE & MEDICAL DECISION MAKING  I have reviewed any medical record information, laboratory studies and radiographic results as noted.    Alexsandra Mims is a 40 y.o. female who presents complaining of spotting and crampy abdominal pain.  Patient is afebrile with a soft, nonsurgical abdomen.    Pt was re-evaluated at 1:44 PM  Patient states she has had very minimal spotting since being here.  Ultrasound demonstrates IUP at 6 weeks and 1 day.  There is a subchorionic hemorrhage and an ovarian cyst.  Patient is Rh+.  Beta  quant is 9130.  UA negative for UTI.    I have referred the patient to the [facility] pregnancy center/[facility]. I also advised the patient to go to [facility] on [address] [facility] for worsening symptoms/pain/bleeding as GYN is available there.  she understands this plan.    Upon recheck, I advised the patient of their testing results and discharge instructions.  The patient demonstrated understanding of these and had no further questions.      FINAL IMPRESSION  1. Bleeding in early pregnancy     2. Cyst of right ovary              Electronically signed by: Stephanie Leslie M.D., 2/11/2020 11:37 AM

## 2020-02-11 NOTE — ED NOTES
"Pt reports intermittent pelvic cramping started last NOC, reports one episode of \"very small amount\" spotting this am.  Pt reports took a home pregnancy test two days ago w/ positive results.  Pt does not have OB/GYN at this time.   PIV placed in LAC, blood drawn and sent to lab.  Pt updated on POC including pending tests and chart review by ERP.  Pt denied any needs at this time.    "

## 2020-02-11 NOTE — ED TRIAGE NOTES
Pt ambulates to triage  Chief Complaint   Patient presents with   • Pregnancy   • Cramping   • Spotting   LMP 12/30/19  abd cramping and pink tinged spotting started this am, pt denies recent injury/trauma  Pt back to room with tech

## 2020-02-11 NOTE — DISCHARGE INSTRUCTIONS
Call the [facility] pregnancy center/[facility] women[facility] health clinic for follow-up    Take Tylenol as needed for pain    Go to [facility] for evaluation for worsening symptoms such as pain, soaking more than 1 pad per hour, fever, or other concerns.

## 2020-02-11 NOTE — ED NOTES
Reviewed discharge instructions w/ pt, verbalized understanding to information provided including follow up care and return precautions, denied questions/concerns.  Pt denied c/o pain at time of discharge.  Pt ambulated from ED.

## 2021-01-04 ENCOUNTER — OFFICE VISIT (OUTPATIENT)
Dept: URGENT CARE | Facility: PHYSICIAN GROUP | Age: 41
End: 2021-01-04
Payer: COMMERCIAL

## 2021-01-04 VITALS
HEIGHT: 66 IN | TEMPERATURE: 97.5 F | DIASTOLIC BLOOD PRESSURE: 78 MMHG | WEIGHT: 211 LBS | HEART RATE: 61 BPM | SYSTOLIC BLOOD PRESSURE: 118 MMHG | BODY MASS INDEX: 33.91 KG/M2 | RESPIRATION RATE: 18 BRPM | OXYGEN SATURATION: 98 %

## 2021-01-04 DIAGNOSIS — L60.0 INGROWN TOENAIL WITH INFECTION: ICD-10-CM

## 2021-01-04 PROCEDURE — 99214 OFFICE O/P EST MOD 30 MIN: CPT | Performed by: PHYSICIAN ASSISTANT

## 2021-01-04 RX ORDER — CLINDAMYCIN HYDROCHLORIDE 300 MG/1
300 CAPSULE ORAL 3 TIMES DAILY
Qty: 30 CAP | Refills: 0 | Status: SHIPPED | OUTPATIENT
Start: 2021-01-04 | End: 2021-01-14

## 2021-01-04 NOTE — PROGRESS NOTES
Chief Complaint   Patient presents with   • Ingrown Toenail     L first digit       HISTORY OF PRESENT ILLNESS: Patient is a 40 y.o. female who presents today for the following:    Patient comes in for evaluation of pain in the left great toe.  She noticed some issues a couple of months ago but it became acutely worse in the last 48 hours.  She now has redness, swelling, purulent drainage.  She has been unable to run because of the pain.  She did change her shoes and it is feeling a lot better.    Patient Active Problem List    Diagnosis Date Noted   • Tobacco use 2018   • Menometrorrhagia 2018   • Cyst of right ovary 2018   • Allergic rhinitis 2016   • Abnormal Pap smear of cervix 2016   • Smoker 2016   • Mild persistent asthma 2016   • Breast disorder 2016   • Encounter for surveillance of contraceptives 2016   • Mild intermittent asthma 2011       Allergies:Amoxicillin, Darvocet [propoxyphene n-apap], Morphine, Pcn [penicillins], Sulfa drugs, and Valium    Current Outpatient Medications Ordered in Epic   Medication Sig Dispense Refill   • clindamycin (CLEOCIN) 300 MG Cap Take 1 Cap by mouth 3 times a day for 10 days. 30 Cap 0     No current Epic-ordered facility-administered medications on file.        Past Medical History:   Diagnosis Date   • Asthma    • Tobacco use        Social History     Tobacco Use   • Smoking status: Former Smoker     Packs/day: 0.50     Years: 20.00     Pack years: 10.00     Types: Cigarettes   • Smokeless tobacco: Never Used   Substance Use Topics   • Alcohol use: Yes     Alcohol/week: 0.0 oz     Comment: RARELY   • Drug use: No       Family Status   Relation Name Status   • Mo  Alive   • Fa     • Sis  Alive   • Bro  Alive     Family History   Problem Relation Age of Onset   • Heart Disease Mother    • Heart Attack Father    • Hypertension Sister    • Hypertension Brother        Review of Systems:   Constitutional ROS:  "No unexpected change in weight, No weakness, No fatigue  Pulmonary ROS: No chronic cough, sputum, or hemoptysis, No dyspnea on exertion, No wheezing  Cardiovascular ROS: No diaphoresis, No edema, No palpitations  Musculoskeletal/Extremities ROS: Left great toe pain  Hematologic/Lymphatic ROS: No chills, No night sweats, No weight loss  Skin/Integumentary ROS: No edema, No evidence of rash, No itching      Exam:  /78   Pulse 61   Temp 36.4 °C (97.5 °F) (Temporal)   Resp 18   Ht 1.676 m (5' 6\")   Wt 95.7 kg (211 lb)   SpO2 98%   General: Well developed, well nourished. No distress.    HENT: Head is grossly normal.  Pulmonary: Unlabored respiratory effort.   Neurologic: Grossly nonfocal. No facial asymmetry noted.  Musculoskeletal: Erythema, edema, purulent drainage noted on the medial skin fold of the left great toe with associated tenderness.  Skin: Warm, dry, good turgor. No rashes in visible areas.   Psych: Normal mood. Alert and oriented to person, place and time.    Assessment/Plan:  Use all medication as directed.  Continue Epson salt soaks.  Discussed appropriate over-the-counter symptomatic medication, and when to return to clinic. Follow up for worsening or persistent symptoms.  1. Ingrown toenail with infection  clindamycin (CLEOCIN) 300 MG Cap       "

## 2021-05-27 ENCOUNTER — OFFICE VISIT (OUTPATIENT)
Dept: URGENT CARE | Facility: CLINIC | Age: 41
End: 2021-05-27
Payer: COMMERCIAL

## 2021-05-27 ENCOUNTER — HOSPITAL ENCOUNTER (OUTPATIENT)
Facility: MEDICAL CENTER | Age: 41
End: 2021-05-27
Attending: PHYSICIAN ASSISTANT
Payer: COMMERCIAL

## 2021-05-27 VITALS
TEMPERATURE: 97.6 F | HEART RATE: 77 BPM | DIASTOLIC BLOOD PRESSURE: 80 MMHG | WEIGHT: 204 LBS | HEIGHT: 67 IN | SYSTOLIC BLOOD PRESSURE: 124 MMHG | OXYGEN SATURATION: 95 % | BODY MASS INDEX: 32.02 KG/M2 | RESPIRATION RATE: 16 BRPM

## 2021-05-27 DIAGNOSIS — K52.9 GASTROENTERITIS: ICD-10-CM

## 2021-05-27 PROCEDURE — U0005 INFEC AGEN DETEC AMPLI PROBE: HCPCS

## 2021-05-27 PROCEDURE — U0003 INFECTIOUS AGENT DETECTION BY NUCLEIC ACID (DNA OR RNA); SEVERE ACUTE RESPIRATORY SYNDROME CORONAVIRUS 2 (SARS-COV-2) (CORONAVIRUS DISEASE [COVID-19]), AMPLIFIED PROBE TECHNIQUE, MAKING USE OF HIGH THROUGHPUT TECHNOLOGIES AS DESCRIBED BY CMS-2020-01-R: HCPCS

## 2021-05-27 PROCEDURE — 99202 OFFICE O/P NEW SF 15 MIN: CPT | Performed by: PHYSICIAN ASSISTANT

## 2021-05-27 RX ORDER — DROSPIRENONE AND ETHINYL ESTRADIOL TABLETS 0.02-3(28)
1 KIT ORAL DAILY
COMMUNITY
Start: 2021-05-18

## 2021-05-27 ASSESSMENT — ENCOUNTER SYMPTOMS
CONSTIPATION: 0
DIARRHEA: 1
COUGH: 0
SHORTNESS OF BREATH: 0
ABDOMINAL PAIN: 0
MUSCULOSKELETAL NEGATIVE: 1
VOMITING: 1
NAUSEA: 1
DIZZINESS: 0
CHILLS: 0
FEVER: 0

## 2021-05-27 NOTE — PROGRESS NOTES
"Subjective:      Alexsandra Villeda is a 41 y.o. female who presents with Food Poisoning (2x days ago, work needs a covid test due to vomitting/nausea)            HPI  Patient presents to urgent care requesting a covid-19 test. She developed nausea, vomiting, and diarrhea 2 days ago that has since resolved. Her son also had similar symptoms. She denies fevers, abdominal pain, urinary symptoms, or respiratory symptoms. She needs a negative covid-19 result before she can return to work. No concerns at this time. She denies known history of covid and doesn't intend on getting immunized against covid.       Review of Systems   Constitutional: Negative for chills and fever.   HENT: Negative for congestion.    Respiratory: Negative for cough and shortness of breath.    Cardiovascular: Negative for chest pain.   Gastrointestinal: Positive for diarrhea, nausea and vomiting. Negative for abdominal pain and constipation.   Genitourinary: Negative.    Musculoskeletal: Negative.    Skin: Negative for rash.   Neurological: Negative for dizziness.        Objective:     /80 (BP Location: Left arm, Patient Position: Sitting, BP Cuff Size: Adult)   Pulse 77   Temp 36.4 °C (97.6 °F) (Temporal)   Resp 16   Ht 1.702 m (5' 7\")   Wt 92.5 kg (204 lb)   SpO2 95%   BMI 31.95 kg/m²        Physical Exam  Vitals and nursing note reviewed.   Constitutional:       General: She is not in acute distress.     Appearance: Normal appearance. She is well-developed. She is not diaphoretic.   HENT:      Head: Normocephalic and atraumatic.      Right Ear: External ear normal.      Left Ear: External ear normal.      Nose: Nose normal.   Eyes:      Conjunctiva/sclera: Conjunctivae normal.   Cardiovascular:      Rate and Rhythm: Normal rate.   Pulmonary:      Effort: Pulmonary effort is normal.   Abdominal:      General: Abdomen is flat.   Musculoskeletal:         General: Normal range of motion.      Cervical back: Normal range of motion. "   Skin:     General: Skin is warm and dry.   Neurological:      Mental Status: She is alert and oriented to person, place, and time.   Psychiatric:         Behavior: Behavior normal.             PMH:  has no past medical history on file.  MEDS:   Current Outpatient Medications:   •  LORYNA 3-0.02 MG per tablet, Take 1 tablet by mouth every day., Disp: , Rfl:   ALLERGIES:   Allergies   Allergen Reactions   • Cillins [Penicillins]      Rash   • Diazepam      Face swells up   • Morphine      Odd welts      SURGHX: History reviewed. No pertinent surgical history.  SOCHX:    FH: family history is not on file.       Assessment/Plan:        1. Gastroenteritis    - SARS-CoV-2 PCR (24 hour In-House): Collect NP swab in VTM; Future      Symptoms were most likely viral in etiology and have since resolved. Covid-19 screening provided today.

## 2021-05-28 LAB
COVID ORDER STATUS COVID19: NORMAL
SARS-COV-2 RNA RESP QL NAA+PROBE: NOTDETECTED
SPECIMEN SOURCE: NORMAL

## 2021-06-21 ENCOUNTER — TELEPHONE (OUTPATIENT)
Dept: SCHEDULING | Facility: IMAGING CENTER | Age: 41
End: 2021-06-21

## 2021-06-24 ENCOUNTER — OFFICE VISIT (OUTPATIENT)
Dept: MEDICAL GROUP | Facility: PHYSICIAN GROUP | Age: 41
End: 2021-06-24
Payer: COMMERCIAL

## 2021-06-24 VITALS
DIASTOLIC BLOOD PRESSURE: 82 MMHG | TEMPERATURE: 98.5 F | WEIGHT: 199.8 LBS | RESPIRATION RATE: 12 BRPM | BODY MASS INDEX: 32.11 KG/M2 | SYSTOLIC BLOOD PRESSURE: 108 MMHG | HEIGHT: 66 IN | OXYGEN SATURATION: 97 % | HEART RATE: 84 BPM

## 2021-06-24 DIAGNOSIS — R41.840 ATTENTION DISTURBANCE: ICD-10-CM

## 2021-06-24 DIAGNOSIS — Z76.89 ENCOUNTER TO ESTABLISH CARE: ICD-10-CM

## 2021-06-24 DIAGNOSIS — F41.9 ANXIETY: ICD-10-CM

## 2021-06-24 PROCEDURE — 99203 OFFICE O/P NEW LOW 30 MIN: CPT | Performed by: NURSE PRACTITIONER

## 2021-06-24 RX ORDER — ESCITALOPRAM OXALATE 10 MG/1
10 TABLET ORAL DAILY
Qty: 30 TABLET | Refills: 1 | Status: SHIPPED | OUTPATIENT
Start: 2021-06-24 | End: 2021-07-08 | Stop reason: SDUPTHER

## 2021-06-24 ASSESSMENT — ANXIETY QUESTIONNAIRES
4. TROUBLE RELAXING: NEARLY EVERY DAY
GAD7 TOTAL SCORE: 10
2. NOT BEING ABLE TO STOP OR CONTROL WORRYING: NOT AT ALL
3. WORRYING TOO MUCH ABOUT DIFFERENT THINGS: NOT AT ALL
7. FEELING AFRAID AS IF SOMETHING AWFUL MIGHT HAPPEN: NOT AT ALL
5. BEING SO RESTLESS THAT IT IS HARD TO SIT STILL: NEARLY EVERY DAY
6. BECOMING EASILY ANNOYED OR IRRITABLE: SEVERAL DAYS
1. FEELING NERVOUS, ANXIOUS, OR ON EDGE: NEARLY EVERY DAY
IF YOU CHECKED OFF ANY PROBLEMS ON THIS QUESTIONNAIRE, HOW DIFFICULT HAVE THESE PROBLEMS MADE IT FOR YOU TO DO YOUR WORK, TAKE CARE OF THINGS AT HOME, OR GET ALONG WITH OTHER PEOPLE: EXTREMELY DIFFICULT

## 2021-06-24 ASSESSMENT — PATIENT HEALTH QUESTIONNAIRE - PHQ9: CLINICAL INTERPRETATION OF PHQ2 SCORE: 0

## 2021-06-24 NOTE — ASSESSMENT & PLAN NOTE
No set about 8-9 yrs go and worse over the past 6 months.  Not on meds.  Precipitating factors: Started new job in Feb 2021 and busy home life.  Started counseling in march and has had little help with symptome's.  Symptoms include lack of focus on doing ADLs and chores, lack of concentration while at work, and restlessness   LAURA-7 Questionnaire    Feeling nervous, anxious, or on edge: Nearly every day  Not being able to sop or control worrying: Not at all  Worrying too much about different things: Not at all  Trouble relaxing: Nearly every day  Being so restless that it's hard to sit still: Nearly every day  Becoming easily annoyed or irritable: Several days  Feeling afraid as if something awful might happen: Not at all  Total: 10    Interpretation of LAURA 7 Total Score   Score Severity :  0-4 No Anxiety   5-9 Mild Anxiety  10-14 Moderate Anxiety  15-21 Severe Anxiety    Denies any history of family history of bipolar.  Denies any suicidal ideations.

## 2021-06-24 NOTE — PROGRESS NOTES
Chief Complaint   Patient presents with   • Establish Care   • Other     Attention span       Subjective:     HPI: Very pleasant 41-year-old female here today to establish care with primary care provider.  She reports no history of primary care provider.    Alexsandra Villeda is a 41 y.o. female here to discuss the evaluation and management of:        Anxiety  No set about 8-9 yrs go and worse over the past 6 months.  Not on meds.  Precipitating factors: Started new job in Feb 2021 and busy home life.  Started counseling in march and has had little help with symptome's.  Symptoms include lack of focus on doing ADLs and chores, lack of concentration while at work, and restlessness   LAURA-7 Questionnaire    Feeling nervous, anxious, or on edge: Nearly every day  Not being able to sop or control worrying: Not at all  Worrying too much about different things: Not at all  Trouble relaxing: Nearly every day  Being so restless that it's hard to sit still: Nearly every day  Becoming easily annoyed or irritable: Several days  Feeling afraid as if something awful might happen: Not at all  Total: 10    Interpretation of LAURA 7 Total Score   Score Severity :  0-4 No Anxiety   5-9 Mild Anxiety  10-14 Moderate Anxiety  15-21 Severe Anxiety    Denies any history of family history of bipolar.  Denies any suicidal ideations.             ROS:  Gen: no fevers/chills,   CV: no chest pain, no palpitations  GI: no nausea/vomiting, no diarrhea  Neuro: no headaches,   Psych: Positive per HPI    Allergies   Allergen Reactions   • Cillins [Penicillins]      Rash   • Diazepam      Face swells up   • Morphine      Odd welts        Current medicines (including changes today)  Current Outpatient Medications   Medication Sig Dispense Refill   • escitalopram (LEXAPRO) 10 MG Tab Take 1 tablet by mouth every day. 30 tablet 1   • LORYNA 3-0.02 MG per tablet Take 1 tablet by mouth every day.       No current facility-administered medications for this  "visit.       Social History     Tobacco Use   • Smoking status: Never Smoker   • Smokeless tobacco: Never Used   Vaping Use   • Vaping Use: Never used   Substance Use Topics   • Alcohol use: Yes     Comment: rare   • Drug use: Never       Patient Active Problem List    Diagnosis Date Noted   • Anxiety        Family History   Problem Relation Age of Onset   • Obesity Mother    • Hypertension Mother    • Heart Attack Father         at age 42   • Hypertension Father    • Hypertension Sister           Objective:     /82 (BP Location: Right arm, Patient Position: Sitting, BP Cuff Size: Large adult)   Pulse 84   Temp 36.9 °C (98.5 °F) (Temporal)   Resp 12   Ht 1.676 m (5' 6\")   Wt 90.6 kg (199 lb 12.8 oz)   SpO2 97%  Body mass index is 32.25 kg/m².    Physical Exam:  Constitutional: Well-developed and well-nourished female in NAD. Not diaphoretic. No distress.   Skin: warm, dry, intact, no evidence of rash or concerning lesions  Head: Atraumatic without lesions.  Eyes: Conjunctivae and extraocular motions are normal. Pupils are equal, round, and reactive to light. No scleral icterus.   Ears:  External ears unremarkable.   Neurological: Alert and oriented x   Psychiatric: Mildly anxious during exam    Unable to complete full physical exam due to urgency in clinic.  Assessment and Plan:     The following treatment plan was discussed:    1. Encounter to establish care  Updated patient and family medical history.  Updated allergies and medications.     2. Anxiety  This is acute on chronic condition.  Patient will continue to work with her counselor and coping mechanisms.  She will start trial of Lexapro and have follow-up in 2 weeks.  - escitalopram (LEXAPRO) 10 MG Tab; Take 1 tablet by mouth every day.  Dispense: 30 tablet; Refill: 1        Any change or worsening of signs or symptoms, patient encouraged to follow-up or report to emergency room for further evaluation. Patient verbalizes understanding and " agrees.    Follow-Up: Return in about 2 weeks (around 7/8/2021) for [facility].      PLEASE NOTE: This dictation was created using voice recognition software. I have made every reasonable attempt to correct obvious errors, but I expect that there are errors of grammar and possibly content that I did not discover before finalizing the note.

## 2021-06-24 NOTE — ASSESSMENT & PLAN NOTE
No set: in the last 6 months.  Started new job in Feb 2021.  Started counseling in march and has had little help with symptome's.

## 2021-06-24 NOTE — PATIENT INSTRUCTIONS
Escitalopram tablets  What is this medicine?  ESCITALOPRAM (es sye NILAY oh pram) is used to treat depression and certain types of anxiety.  This medicine may be used for other purposes; ask your health care provider or pharmacist if you have questions.  COMMON BRAND NAME(S): Lexapro  What should I tell my health care provider before I take this medicine?  They need to know if you have any of these conditions:  · bipolar disorder or a family history of bipolar disorder  · diabetes  · glaucoma  · heart disease  · kidney or liver disease  · receiving electroconvulsive therapy  · seizures (convulsions)  · suicidal thoughts, plans, or attempt by you or a family member  · an unusual or allergic reaction to escitalopram, the related drug citalopram, other medicines, foods, dyes, or preservatives  · pregnant or trying to become pregnant  · breast-feeding  How should I use this medicine?  Take this medicine by mouth with a glass of water. Follow the directions on the prescription label. You can take it with or without food. If it upsets your stomach, take it with food. Take your medicine at regular intervals. Do not take it more often than directed. Do not stop taking this medicine suddenly except upon the advice of your doctor. Stopping this medicine too quickly may cause serious side effects or your condition may worsen.  A special MedGuide will be given to you by the pharmacist with each prescription and refill. Be sure to read this information carefully each time.  Talk to your pediatrician regarding the use of this medicine in children. Special care may be needed.  Overdosage: If you think you have taken too much of this medicine contact a poison control center or emergency room at once.  NOTE: This medicine is only for you. Do not share this medicine with others.  What if I miss a dose?  If you miss a dose, take it as soon as you can. If it is almost time for your next dose, take only that dose. Do not take double or  extra doses.  What may interact with this medicine?  Do not take this medicine with any of the following medications:  · certain medicines for fungal infections like fluconazole, itraconazole, ketoconazole, posaconazole, voriconazole  · cisapride  · citalopram  · dronedarone  · linezolid  · MAOIs like Carbex, Eldepryl, Marplan, Nardil, and Parnate  · methylene blue (injected into a vein)  · pimozide  · thioridazine  This medicine may also interact with the following medications:  · alcohol  · amphetamines  · aspirin and aspirin-like medicines  · carbamazepine  · certain medicines for depression, anxiety, or psychotic disturbances  · certain medicines for migraine headache like almotriptan, eletriptan, frovatriptan, naratriptan, rizatriptan, sumatriptan, zolmitriptan  · certain medicines for sleep  · certain medicines that treat or prevent blood clots like warfarin, enoxaparin, dalteparin  · cimetidine  · diuretics  · dofetilide  · fentanyl  · furazolidone  · isoniazid  · lithium  · metoprolol  · NSAIDs, medicines for pain and inflammation, like ibuprofen or naproxen  · other medicines that prolong the QT interval (cause an abnormal heart rhythm)  · procarbazine  · rasagiline  · supplements like [place]'s wort, kava kava, valerian  · tramadol  · tryptophan  · ziprasidone  This list may not describe all possible interactions. Give your health care provider a list of all the medicines, herbs, non-prescription drugs, or dietary supplements you use. Also tell them if you smoke, drink alcohol, or use illegal drugs. Some items may interact with your medicine.  What should I watch for while using this medicine?  Tell your doctor if your symptoms do not get better or if they get worse. Visit your doctor or health care professional for regular checks on your progress. Because it may take several weeks to see the full effects of this medicine, it is important to continue your treatment as prescribed by your doctor.  Patients  and their families should watch out for new or worsening thoughts of suicide or depression. Also watch out for sudden changes in feelings such as feeling anxious, agitated, panicky, irritable, hostile, aggressive, impulsive, severely restless, overly excited and hyperactive, or not being able to sleep. If this happens, especially at the beginning of treatment or after a change in dose, call your health care professional.  You may get drowsy or dizzy. Do not drive, use machinery, or do anything that needs mental alertness until you know how this medicine affects you. Do not stand or sit up quickly, especially if you are an older patient. This reduces the risk of dizzy or fainting spells. Alcohol may interfere with the effect of this medicine. Avoid alcoholic drinks.  Your mouth may get dry. Chewing sugarless gum or sucking hard candy, and drinking plenty of water may help. Contact your doctor if the problem does not go away or is severe.  What side effects may I notice from receiving this medicine?  Side effects that you should report to your doctor or health care professional as soon as possible:  · allergic reactions like skin rash, itching or hives, swelling of the face, lips, or tongue  · anxious  · black, tarry stools  · changes in vision  · confusion  · elevated mood, decreased need for sleep, racing thoughts, impulsive behavior  · eye pain  · fast, irregular heartbeat  · feeling faint or lightheaded, falls  · feeling agitated, angry, or irritable  · hallucination, loss of contact with reality  · loss of balance or coordination  · loss of memory  · painful or prolonged erections  · restlessness, pacing, inability to keep still  · seizures  · stiff muscles  · suicidal thoughts or other mood changes  · trouble sleeping  · unusual bleeding or bruising  · unusually weak or tired  · vomiting  Side effects that usually do not require medical attention (report to your doctor or health care professional if they  continue or are bothersome):  · changes in appetite  · change in sex drive or performance  · headache  · increased sweating  · indigestion, nausea  · tremors  This list may not describe all possible side effects. Call your doctor for medical advice about side effects. You may report side effects to FDA at 1-973-TAK-2973.  Where should I keep my medicine?  Keep out of reach of children.  Store at room temperature between 15 and 30 degrees C (59 and 86 degrees F). Throw away any unused medicine after the expiration date.  NOTE: This sheet is a summary. It may not cover all possible information. If you have questions about this medicine, talk to your doctor, pharmacist, or health care provider.  © 2020 [employer]/Gold Standard (2019-12-09 11:21:44)  Generalized Anxiety Disorder, Adult  Generalized anxiety disorder (LAURA) is a mental health disorder. People with this condition constantly worry about everyday events. Unlike normal anxiety, worry related to LAURA is not triggered by a specific event. These worries also do not fade or get better with time. LAURA interferes with life functions, including relationships, work, and school.  LAURA can vary from mild to severe. People with severe LAURA can have intense waves of anxiety with physical symptoms (panic attacks).  What are the causes?  The exact cause of LAURA is not known.  What increases the risk?  This condition is more likely to develop in:  · Women.  · People who have a family history of anxiety disorders.  · People who are very shy.  · People who experience very stressful life events, such as the death of a loved one.  · People who have a very stressful family environment.  What are the signs or symptoms?  People with LAURA often worry excessively about many things in their lives, such as their health and family. They may also be overly concerned about:  · Doing well at work.  · Being on time.  · Natural disasters.  · Friendships.  Physical symptoms of LAURA  include:  · Fatigue.  · Muscle tension or having muscle twitches.  · Trembling or feeling shaky.  · Being easily startled.  · Feeling like your heart is pounding or racing.  · Feeling out of breath or like you cannot take a deep breath.  · Having trouble falling asleep or staying asleep.  · Sweating.  · Nausea, diarrhea, or irritable bowel syndrome (IBS).  · Headaches.  · Trouble concentrating or remembering facts.  · Restlessness.  · Irritability.  How is this diagnosed?  Your health care provider can diagnose LAURA based on your symptoms and medical history. You will also have a physical exam. The health care provider will ask specific questions about your symptoms, including how severe they are, when they started, and if they come and go. Your health care provider may ask you about your use of alcohol or drugs, including prescription medicines. Your health care provider may refer you to a mental health specialist for further evaluation.  Your health care provider will do a thorough examination and may perform additional tests to rule out other possible causes of your symptoms.  To be diagnosed with LAURA, a person must have anxiety that:  · Is out of his or her control.  · Affects several different aspects of his or her life, such as work and relationships.  · Causes distress that makes him or her unable to take part in normal activities.  · Includes at least three physical symptoms of LAURA, such as restlessness, fatigue, trouble concentrating, irritability, muscle tension, or sleep problems.  Before your health care provider can confirm a diagnosis of LAURA, these symptoms must be present more days than they are not, and they must last for six months or longer.  How is this treated?  The following therapies are usually used to treat LAURA:  · Medicine. Antidepressant medicine is usually prescribed for long-term daily control. Antianxiety medicines may be added in severe cases, especially when panic attacks occur.  · Talk  therapy (psychotherapy). Certain types of talk therapy can be helpful in treating LAURA by providing support, education, and guidance. Options include:  ? Cognitive behavioral therapy (CBT). People learn coping skills and techniques to ease their anxiety. They learn to identify unrealistic or negative thoughts and behaviors and to replace them with positive ones.  ? Acceptance and commitment therapy (ACT). This treatment teaches people how to be mindful as a way to cope with unwanted thoughts and feelings.  ? Biofeedback. This process trains you to manage your body's response (physiological response) through breathing techniques and relaxation methods. You will work with a therapist while machines are used to monitor your physical symptoms.  · Stress management techniques. These include yoga, meditation, and exercise.  A mental health specialist can help determine which treatment is best for you. Some people see improvement with one type of therapy. However, other people require a combination of therapies.  Follow these instructions at home:  · Take over-the-counter and prescription medicines only as told by your health care provider.  · Try to maintain a normal routine.  · Try to anticipate stressful situations and allow extra time to manage them.  · Practice any stress management or self-calming techniques as taught by your health care provider.  · Do not punish yourself for setbacks or for not making progress.  · Try to recognize your accomplishments, even if they are small.  · Keep all follow-up visits as told by your health care provider. This is important.  Contact a health care provider if:  · Your symptoms do not get better.  · Your symptoms get worse.  · You have signs of depression, such as:  ? A persistently sad, cranky, or irritable mood.  ? Loss of enjoyment in activities that used to bring you reid.  ? Change in weight or eating.  ? Changes in sleeping habits.  ? Avoiding friends or family  members.  ? Loss of energy for normal tasks.  ? Feelings of guilt or worthlessness.  Get help right away if:  · You have serious thoughts about hurting yourself or others.  If you ever feel like you may hurt yourself or others, or have thoughts about taking your own life, get help right away. You can go to your nearest emergency department or call:  · Your local emergency services (911 in the [place]  · A suicide crisis helpline, such as the National Suicide Prevention Lifeline at 1-134.977.8221. This is open 24 hours a day.  Summary  · Generalized anxiety disorder (LAURA) is a mental health disorder that involves worry that is not triggered by a specific event.  · People with LAURA often worry excessively about many things in their lives, such as their health and family.  · LAURA may cause physical symptoms such as restlessness, trouble concentrating, sleep problems, frequent sweating, nausea, diarrhea, headaches, and trembling or muscle twitching.  · A mental health specialist can help determine which treatment is best for you. Some people see improvement with one type of therapy. However, other people require a combination of therapies.  This information is not intended to replace advice given to you by your health care provider. Make sure you discuss any questions you have with your health care provider.  Document Released: 04/14/2014 Document Revised: 11/30/2018 Document Reviewed: 11/07/2017  Elsevier Patient Education © 2020 [employer]

## 2021-07-08 ENCOUNTER — OFFICE VISIT (OUTPATIENT)
Dept: MEDICAL GROUP | Facility: PHYSICIAN GROUP | Age: 41
End: 2021-07-08
Payer: COMMERCIAL

## 2021-07-08 VITALS
DIASTOLIC BLOOD PRESSURE: 66 MMHG | HEART RATE: 71 BPM | OXYGEN SATURATION: 98 % | SYSTOLIC BLOOD PRESSURE: 122 MMHG | WEIGHT: 200 LBS | BODY MASS INDEX: 32.14 KG/M2 | HEIGHT: 66 IN | TEMPERATURE: 98 F

## 2021-07-08 DIAGNOSIS — F41.9 ANXIETY: ICD-10-CM

## 2021-07-08 DIAGNOSIS — F32.1 CURRENT MODERATE EPISODE OF MAJOR DEPRESSIVE DISORDER WITHOUT PRIOR EPISODE (HCC): ICD-10-CM

## 2021-07-08 DIAGNOSIS — Z13.6 SCREENING FOR CARDIOVASCULAR CONDITION: ICD-10-CM

## 2021-07-08 DIAGNOSIS — Z12.31 ENCOUNTER FOR SCREENING MAMMOGRAM FOR BREAST CANCER: ICD-10-CM

## 2021-07-08 DIAGNOSIS — Z13.21 ENCOUNTER FOR VITAMIN DEFICIENCY SCREENING: ICD-10-CM

## 2021-07-08 PROBLEM — F32.9 MAJOR DEPRESSIVE DISORDER WITH CURRENT ACTIVE EPISODE: Status: ACTIVE | Noted: 2021-07-08

## 2021-07-08 PROCEDURE — 99214 OFFICE O/P EST MOD 30 MIN: CPT | Performed by: NURSE PRACTITIONER

## 2021-07-08 RX ORDER — PROPRANOLOL HYDROCHLORIDE 10 MG/1
10 TABLET ORAL 3 TIMES DAILY
Qty: 90 TABLET | Refills: 5 | Status: SHIPPED | OUTPATIENT
Start: 2021-07-08 | End: 2022-01-06

## 2021-07-08 RX ORDER — ESCITALOPRAM OXALATE 10 MG/1
10 TABLET ORAL DAILY
Qty: 90 TABLET | Refills: 3 | Status: SHIPPED | OUTPATIENT
Start: 2021-07-08 | End: 2022-01-06

## 2021-07-08 ASSESSMENT — ANXIETY QUESTIONNAIRES
3. WORRYING TOO MUCH ABOUT DIFFERENT THINGS: NOT AT ALL
6. BECOMING EASILY ANNOYED OR IRRITABLE: SEVERAL DAYS
GAD7 TOTAL SCORE: 6
2. NOT BEING ABLE TO STOP OR CONTROL WORRYING: NOT AT ALL
5. BEING SO RESTLESS THAT IT IS HARD TO SIT STILL: NEARLY EVERY DAY
IF YOU CHECKED OFF ANY PROBLEMS ON THIS QUESTIONNAIRE, HOW DIFFICULT HAVE THESE PROBLEMS MADE IT FOR YOU TO DO YOUR WORK, TAKE CARE OF THINGS AT HOME, OR GET ALONG WITH OTHER PEOPLE: VERY DIFFICULT
7. FEELING AFRAID AS IF SOMETHING AWFUL MIGHT HAPPEN: NOT AT ALL
4. TROUBLE RELAXING: SEVERAL DAYS
1. FEELING NERVOUS, ANXIOUS, OR ON EDGE: SEVERAL DAYS

## 2021-07-08 ASSESSMENT — PATIENT HEALTH QUESTIONNAIRE - PHQ9
SUM OF ALL RESPONSES TO PHQ QUESTIONS 1-9: 12
5. POOR APPETITE OR OVEREATING: 3 - NEARLY EVERY DAY
CLINICAL INTERPRETATION OF PHQ2 SCORE: 2

## 2021-07-08 NOTE — ASSESSMENT & PLAN NOTE
LAURA 7 6/24/2021 7/8/2021   LAURA-7 Total Score 10 6       Interpretation of LAURA 7 Total Score   Score Severity:  0-4 No Anxiety   5-9 Mild Anxiety  10-14 Moderate Anxiety  15-21 Severe Anxiety    Is a chronic condition.  Patient has been on Lexapro for approximately 2 weeks and is tolerating it well.  She does have mild side effects of jitteriness.  I did discuss with her that this is common and symptoms should subside in approximately 6 weeks max.  She does continue to speak with a counselor over the phone to help with her anxiety and depression.  She reports that her symptoms include lack of focus and inability to collect her thoughts.  She also reports that she has had decreased eating with almost no appetite.  Did discuss with her her coping mechanisms which include hanging out with friends, being with her daughter, and exercising.

## 2021-07-08 NOTE — PATIENT INSTRUCTIONS
Generalized Anxiety Disorder, Adult  Generalized anxiety disorder (LAURA) is a mental health disorder. People with this condition constantly worry about everyday events. Unlike normal anxiety, worry related to LAURA is not triggered by a specific event. These worries also do not fade or get better with time. LAURA interferes with life functions, including relationships, work, and school.  LAURA can vary from mild to severe. People with severe LAURA can have intense waves of anxiety with physical symptoms (panic attacks).  What are the causes?  The exact cause of LAURA is not known.  What increases the risk?  This condition is more likely to develop in:  · Women.  · People who have a family history of anxiety disorders.  · People who are very shy.  · People who experience very stressful life events, such as the death of a loved one.  · People who have a very stressful family environment.  What are the signs or symptoms?  People with LAURA often worry excessively about many things in their lives, such as their health and family. They may also be overly concerned about:  · Doing well at work.  · Being on time.  · Natural disasters.  · Friendships.  Physical symptoms of LARUA include:  · Fatigue.  · Muscle tension or having muscle twitches.  · Trembling or feeling shaky.  · Being easily startled.  · Feeling like your heart is pounding or racing.  · Feeling out of breath or like you cannot take a deep breath.  · Having trouble falling asleep or staying asleep.  · Sweating.  · Nausea, diarrhea, or irritable bowel syndrome (IBS).  · Headaches.  · Trouble concentrating or remembering facts.  · Restlessness.  · Irritability.  How is this diagnosed?  Your health care provider can diagnose LAURA based on your symptoms and medical history. You will also have a physical exam. The health care provider will ask specific questions about your symptoms, including how severe they are, when they started, and if they come and go. Your health care  provider may ask you about your use of alcohol or drugs, including prescription medicines. Your health care provider may refer you to a mental health specialist for further evaluation.  Your health care provider will do a thorough examination and may perform additional tests to rule out other possible causes of your symptoms.  To be diagnosed with LAURA, a person must have anxiety that:  · Is out of his or her control.  · Affects several different aspects of his or her life, such as work and relationships.  · Causes distress that makes him or her unable to take part in normal activities.  · Includes at least three physical symptoms of LAURA, such as restlessness, fatigue, trouble concentrating, irritability, muscle tension, or sleep problems.  Before your health care provider can confirm a diagnosis of LAURA, these symptoms must be present more days than they are not, and they must last for six months or longer.  How is this treated?  The following therapies are usually used to treat LAURA:  · Medicine. Antidepressant medicine is usually prescribed for long-term daily control. Antianxiety medicines may be added in severe cases, especially when panic attacks occur.  · Talk therapy (psychotherapy). Certain types of talk therapy can be helpful in treating LAURA by providing support, education, and guidance. Options include:  ? Cognitive behavioral therapy (CBT). People learn coping skills and techniques to ease their anxiety. They learn to identify unrealistic or negative thoughts and behaviors and to replace them with positive ones.  ? Acceptance and commitment therapy (ACT). This treatment teaches people how to be mindful as a way to cope with unwanted thoughts and feelings.  ? Biofeedback. This process trains you to manage your body's response (physiological response) through breathing techniques and relaxation methods. You will work with a therapist while machines are used to monitor your physical symptoms.  · Stress  management techniques. These include yoga, meditation, and exercise.  A mental health specialist can help determine which treatment is best for you. Some people see improvement with one type of therapy. However, other people require a combination of therapies.  Follow these instructions at home:  · Take over-the-counter and prescription medicines only as told by your health care provider.  · Try to maintain a normal routine.  · Try to anticipate stressful situations and allow extra time to manage them.  · Practice any stress management or self-calming techniques as taught by your health care provider.  · Do not punish yourself for setbacks or for not making progress.  · Try to recognize your accomplishments, even if they are small.  · Keep all follow-up visits as told by your health care provider. This is important.  Contact a health care provider if:  · Your symptoms do not get better.  · Your symptoms get worse.  · You have signs of depression, such as:  ? A persistently sad, cranky, or irritable mood.  ? Loss of enjoyment in activities that used to bring you reid.  ? Change in weight or eating.  ? Changes in sleeping habits.  ? Avoiding friends or family members.  ? Loss of energy for normal tasks.  ? Feelings of guilt or worthlessness.  Get help right away if:  · You have serious thoughts about hurting yourself or others.  If you ever feel like you may hurt yourself or others, or have thoughts about taking your own life, get help right away. You can go to your nearest emergency department or call:  · Your local emergency services (911 in the [place]  · A suicide crisis helpline, such as the National Suicide Prevention Lifeline at 1-241.344.4826. This is open 24 hours a day.  Summary  · Generalized anxiety disorder (LAURA) is a mental health disorder that involves worry that is not triggered by a specific event.  · People with LAURA often worry excessively about many things in their lives, such as their health and  family.  · LAURA may cause physical symptoms such as restlessness, trouble concentrating, sleep problems, frequent sweating, nausea, diarrhea, headaches, and trembling or muscle twitching.  · A mental health specialist can help determine which treatment is best for you. Some people see improvement with one type of therapy. However, other people require a combination of therapies.  This information is not intended to replace advice given to you by your health care provider. Make sure you discuss any questions you have with your health care provider.  Document Released: 04/14/2014 Document Revised: 11/30/2018 Document Reviewed: 11/07/2017  [employer] Patient Education © 2020 [employer]      Depression, Adult  Depression is feeling sad, low, down in the dumps, blue, gloomy, or empty. In general, there are two kinds of depression:  · Normal sadness or grief. This can happen after something upsetting. It often goes away on its own within 2 weeks. After losing a loved one (bereavement), normal sadness and grief may last longer than two weeks. It usually gets better with time.  · Clinical depression. This kind lasts longer than normal sadness or grief. It keeps you from doing the things you normally do in life. It is often hard to function at home, work, or at school. It may affect your relationships with others. Treatment is often needed.  GET HELP RIGHT AWAY IF:  · You have thoughts about hurting yourself or others.  · You lose touch with reality (psychotic symptoms). You may:  ¨ See or hear things that are not real.  ¨ Have untrue beliefs about your life or people around you.  · Your medicine is giving you problems.  MAKE SURE YOU:  · Understand these instructions.  · Will watch your condition.  · Will get help right away if you are not doing well or get worse.     This information is not intended to replace advice given to you by your health care provider. Make sure you discuss any questions you have with your health  care provider.     Document Released: 01/20/2012 Document Revised: 01/08/2016 Document Reviewed: 04/18/2013  Elsevier Interactive Patient Education ©2016 [employer]

## 2021-07-08 NOTE — ASSESSMENT & PLAN NOTE
Depression Screen (PHQ-2/PHQ-9) 6/24/2021 7/8/2021   PHQ-2 Total Score 0 2   PHQ-9 Total Score - 12     Interpretation of PHQ-9 Total Score   Score Severity   1-4 No Depression   5-9 Mild Depression   10-14 Moderate Depression   15-19 Moderately Severe Depression   20-27 Severe Depression    This is a new condition.  Patient denies feeling depressed however PHQ-9 score indicates moderate depression.  Patient was surprised by this new diagnosis and states that she is normally a happy person and not want to feel depressed.  She is concerned that one of her main symptoms is lack of appetite as she only eats approximately 1 meal a day and is consuming only approximately 1000 ariel.  I did encourage the patient to taking calories otherwise such as protein shakes or small meals frequently to help decrease her symptoms which include lack of appetite and nausea.  She denies any fever, chills, nausea, vomiting, diarrhea or constipation.  She denies any suicidal ideations.  She does report that she will be going to [place] with the girls week and I did caution her about drinking large amounts of alcohol in the onset of anxiety and depression.

## 2021-07-08 NOTE — PROGRESS NOTES
Chief Complaint   Patient presents with   • Follow-Up     Depression       Subjective:     HPI:   Alexsandra Villeda is a 41 y.o. female here to discuss the evaluation and management of:        Anxiety  LAURA 7 6/24/2021 7/8/2021   LAURA-7 Total Score 10 6       Interpretation of LAURA 7 Total Score   Score Severity:  0-4 No Anxiety   5-9 Mild Anxiety  10-14 Moderate Anxiety  15-21 Severe Anxiety    Is a chronic condition.  Patient has been on Lexapro for approximately 2 weeks and is tolerating it well.  She does have mild side effects of jitteriness.  I did discuss with her that this is common and symptoms should subside in approximately 6 weeks max.  She does continue to speak with a counselor over the phone to help with her anxiety and depression.  She reports that her symptoms include lack of focus and inability to collect her thoughts.  She also reports that she has had decreased eating with almost no appetite.  Did discuss with her her coping mechanisms which include hanging out with friends, being with her daughter, and exercising.      Major depressive disorder with current active episode  Depression Screen (PHQ-2/PHQ-9) 6/24/2021 7/8/2021   PHQ-2 Total Score 0 2   PHQ-9 Total Score - 12     Interpretation of PHQ-9 Total Score   Score Severity   1-4 No Depression   5-9 Mild Depression   10-14 Moderate Depression   15-19 Moderately Severe Depression   20-27 Severe Depression    This is a new condition.  Patient denies feeling depressed however PHQ-9 score indicates moderate depression.  Patient was surprised by this new diagnosis and states that she is normally a happy person and not want to feel depressed.  She is concerned that one of her main symptoms is lack of appetite as she only eats approximately 1 meal a day and is consuming only approximately 1000 ariel.  I did encourage the patient to taking calories otherwise such as protein shakes or small meals frequently to help decrease her symptoms which include lack  "of appetite and nausea.  She denies any fever, chills, nausea, vomiting, diarrhea or constipation.  She denies any suicidal ideations.  She does report that she will be going to [place] with the girls week and I did caution her about drinking large amounts of alcohol in the onset of anxiety and depression.      ROS:  Gen: no fevers/chills, no changes in weight  Eyes: no changes in vision  ENT: no sore throat, no hearing loss, no bloody nose  Pulm: no sob, no cough  CV: no chest pain, no palpitations  GI: no nausea/vomiting, no diarrhea  : no dysuria  MSk: no myalgias  Skin: no rash  Neuro: no headaches, no numbness/tingling      Allergies   Allergen Reactions   • Cillins [Penicillins]      Rash   • Diazepam      Face swells up   • Morphine      Odd welts        Current medicines (including changes today)  Current Outpatient Medications   Medication Sig Dispense Refill   • propranolol (INDERAL) 10 MG Tab Take 1 tablet by mouth 3 times a day. 90 tablet 5   • escitalopram (LEXAPRO) 10 MG Tab Take 1 tablet by mouth every day. 90 tablet 3   • LORYNA 3-0.02 MG per tablet Take 1 tablet by mouth every day.       No current facility-administered medications for this visit.       Social History     Tobacco Use   • Smoking status: Never Smoker   • Smokeless tobacco: Never Used   Vaping Use   • Vaping Use: Never used   Substance Use Topics   • Alcohol use: Yes     Comment: rare   • Drug use: Never       Patient Active Problem List    Diagnosis Date Noted   • Major depressive disorder with current active episode 07/08/2021   • Anxiety        Family History   Problem Relation Age of Onset   • Obesity Mother    • Hypertension Mother    • Heart Attack Father         at age 42   • Hypertension Father    • Hypertension Sister    • Cancer Sister         Cervical and ovarian          Objective:     /66   Pulse 71   Temp 36.7 °C (98 °F)   Ht 1.664 m (5' 5.5\")   Wt 90.7 kg (200 lb)   SpO2 98%  Body mass index is 32.78 " kg/m².    Physical Exam:  Constitutional: Well-developed and well-nourished female in NAD. Not diaphoretic. No distress.   Skin: warm, dry, intact, no evidence of rash or concerning lesions  Head: Atraumatic without lesions.  Eyes: Conjunctivae and extraocular motions are normal. Pupils are equal, round, and reactive to light. No scleral icterus.   Ears:  External ears unremarkable. TMs normal; bilaterally  Mouth/Throat: Tongue normal. Oropharynx is clear and moist. Posterior pharynx without erythema or exudates.  Neck: Supple, trachea midline. No thyromegaly present. No cervical or supraclavicular lymphadenopathy.  Cardiovascular: Regular rate and rhythm without murmur. Radial pulses are intact and equal bilaterally.  Pulmonary: Clear to ausculation. Normal effort. No rales, ronchi, or wheezing.  Abdomen: Soft, non tender, and without distention. Active bowel sounds in all four quadrants.   Extremities: No cyanosis, clubbing, erythema, nor edema.   Neurological: Alert and oriented x 3. No cranial nerve deficit. 5/5 myotomes. Sensation intact.   Psychiatric:  Behavior, mood, and affect are appropriate.       Assessment and Plan:     The following treatment plan was discussed:    1. Anxiety  Discussed proper coping mechanisms such as yoga, working out, and continue to speak with her counselor.  Patient will continue on her Lexapro as prescribed.  She was agreeable for trial of propanolol for as needed anxiety symptoms.  - propranolol (INDERAL) 10 MG Tab; Take 1 tablet by mouth 3 times a day.  Dispense: 90 tablet; Refill: 5    2. Current moderate episode of major depressive disorder without prior episode (HCC)  Discussed multiple signs and symptoms of depression with patient.  Also discussed what to do if she starts having suicidal ideations.  ER precautions were given.  Patient will continue on her Lexapro and speaking with her counselor.  Labs have been ordered as stated below.    - CBC WITH DIFFERENTIAL; Future  -  VITAMIN B12; Future  - VITAMIN D,25 HYDROXY; Future    3. Screening for cardiovascular condition  - TSH WITH REFLEX TO FT4; Future  - Comp Metabolic Panel; Future  - Lipid Profile; Future    4. Encounter for screening mammogram for breast cancer  - MA-SCREENING MAMMO BILAT W/CAD; Future    5. Encounter for vitamin deficiency screening  - VITAMIN B12; Future  - VITAMIN D,25 HYDROXY; Future       Any change or worsening of signs or symptoms, patient encouraged to follow-up or report to emergency room for further evaluation. Patient verbalizes understanding and agrees.    Follow-Up: Return in about 4 weeks (around 8/5/2021) for LAURA.      PLEASE NOTE: This dictation was created using voice recognition software. I have made every reasonable attempt to correct obvious errors, but I expect that there are errors of grammar and possibly content that I did not discover before finalizing the note.

## 2021-07-27 ENCOUNTER — TELEPHONE (OUTPATIENT)
Dept: MEDICAL GROUP | Facility: PHYSICIAN GROUP | Age: 41
End: 2021-07-27

## 2021-07-27 DIAGNOSIS — J45.20 MILD INTERMITTENT ASTHMA, UNSPECIFIED WHETHER COMPLICATED: ICD-10-CM

## 2021-07-27 RX ORDER — ALBUTEROL SULFATE 90 UG/1
2 AEROSOL, METERED RESPIRATORY (INHALATION) EVERY 4 HOURS PRN
Qty: 8 G | Refills: 0 | Status: SHIPPED | OUTPATIENT
Start: 2021-07-27 | End: 2023-06-13

## 2021-07-27 NOTE — TELEPHONE ENCOUNTER
Pt requesting an inhaler if possible . She States it hard for her to breath due to the smoke.    She has an appointment on 08/05/2021

## 2021-08-02 ENCOUNTER — HOSPITAL ENCOUNTER (OUTPATIENT)
Facility: MEDICAL CENTER | Age: 41
End: 2021-08-02
Attending: PHYSICIAN ASSISTANT
Payer: COMMERCIAL

## 2021-08-02 ENCOUNTER — OFFICE VISIT (OUTPATIENT)
Dept: URGENT CARE | Facility: PHYSICIAN GROUP | Age: 41
End: 2021-08-02
Payer: COMMERCIAL

## 2021-08-02 VITALS
HEART RATE: 98 BPM | SYSTOLIC BLOOD PRESSURE: 118 MMHG | TEMPERATURE: 97.4 F | DIASTOLIC BLOOD PRESSURE: 82 MMHG | WEIGHT: 199 LBS | OXYGEN SATURATION: 96 % | HEIGHT: 66 IN | RESPIRATION RATE: 16 BRPM | BODY MASS INDEX: 31.98 KG/M2

## 2021-08-02 DIAGNOSIS — R68.89 FLU-LIKE SYMPTOMS: ICD-10-CM

## 2021-08-02 DIAGNOSIS — R06.02 SOB (SHORTNESS OF BREATH): ICD-10-CM

## 2021-08-02 PROCEDURE — 99214 OFFICE O/P EST MOD 30 MIN: CPT | Performed by: PHYSICIAN ASSISTANT

## 2021-08-02 PROCEDURE — 0241U HCHG SARS-COV-2 COVID-19 NFCT DS RESP RNA 4 TRGT MIC: CPT

## 2021-08-02 RX ORDER — ALBUTEROL SULFATE 90 UG/1
2 AEROSOL, METERED RESPIRATORY (INHALATION) EVERY 6 HOURS PRN
Qty: 8.5 G | Refills: 1 | Status: SHIPPED | OUTPATIENT
Start: 2021-08-02 | End: 2023-06-13

## 2021-08-02 RX ORDER — DEXAMETHASONE 6 MG/1
6 TABLET ORAL DAILY
Qty: 7 TABLET | Refills: 0 | Status: SHIPPED | OUTPATIENT
Start: 2021-08-02 | End: 2021-08-09

## 2021-08-02 NOTE — LETTER
August 2, 2021         Patient: Alexsandra Villeda   YOB: 1980   Date of Visit: 8/2/2021           To Whom it May Concern:    Alexsandra Villeda was seen in my clinic on 8/2/2021.     Please excuse patient for missing school/work until COVID results are available, typically taking 1-3 days.  They have been advised to quarantine during this time.      If you have any questions or concerns, please don't hesitate to call.        Sincerely,           Jessica Holman P.A.-C.  Electronically Signed

## 2021-08-02 NOTE — PROGRESS NOTES
Chief Complaint   Patient presents with   • Coronavirus Screening     SOB, Fatigue, and cough, x1week, needs test for work        HISTORY OF PRESENT ILLNESS: Patient is a 41 y.o. female who presents today for the following:    Cough x 1 week  + nasal congestion, runny nose, SOB, plugged ears, no smell/taste  H/o exercise induced asthma; using albuterol 4x/day for the last week  Very tired, sleeping more than normal  100.9 temp over the weekend  Denies chills/body aches  No h/o COVID vaccine    Patient Active Problem List    Diagnosis Date Noted   • Major depressive disorder with current active episode 2021   • Anxiety        Allergies:Cillins [penicillins], Diazepam, and Morphine    Current Outpatient Medications Ordered in Epic   Medication Sig Dispense Refill   • dexamethasone (DECADRON) 6 MG Tab Take 1 tablet by mouth every day for 7 days. 7 tablet 0   • albuterol 108 (90 Base) MCG/ACT Aero Soln inhalation aerosol Inhale 2 Puffs every 6 hours as needed for Shortness of Breath. 8.5 g 1   • albuterol 108 (90 Base) MCG/ACT Aero Soln inhalation aerosol Inhale 2 Puffs every four hours as needed for Shortness of Breath. 8 g 0   • propranolol (INDERAL) 10 MG Tab Take 1 tablet by mouth 3 times a day. 90 tablet 5   • escitalopram (LEXAPRO) 10 MG Tab Take 1 tablet by mouth every day. 90 tablet 3   • LORYNA 3-0.02 MG per tablet Take 1 tablet by mouth every day.       No current [facility]-ordered facility-administered medications on file.       Past Medical History:   Diagnosis Date   • Anemia     during preg   • Anxiety    • Major depressive disorder with current active episode 2021       Social History     Tobacco Use   • Smoking status: Never Smoker   • Smokeless tobacco: Never Used   Vaping Use   • Vaping Use: Never used   Substance Use Topics   • Alcohol use: Yes     Comment: rare   • Drug use: Never       Family Status   Relation Name Status   • Mo  Alive   • Fa     • Sis  Alive   • Bro  Alive   • MGMo   "   • MGFa     • PGMo     • PGFa       Family History   Problem Relation Age of Onset   • Obesity Mother    • Hypertension Mother    • Heart Attack Father         at age 42   • Hypertension Father    • Hypertension Sister    • Cancer Sister         Cervical and ovarian       Review of Systems:   Constitutional ROS: No unexpected change in weight  Pulmonary ROS: + SOB  Cardiovascular ROS: No diaphoresis, No edema, No palpitations  Hematologic/Lymphatic ROS: + fever  Skin/Integumentary ROS: No edema, No evidence of rash, No itching    Exam:  /82   Pulse 98   Temp 36.3 °C (97.4 °F) (Temporal)   Resp 16   Ht 1.676 m (5' 6\")   Wt 90.3 kg (199 lb)   SpO2 96%   General: Well developed, well nourished. No distress.    Eye: PERRL/EOMI; conjunctivae clear, lids normal.  ENMT: Lips without lesions, MMM. Oropharynx is clear. Bilateral TMs are within normal limits.  Pulmonary: Unlabored respiratory effort. Lungs clear to auscultation, no wheezes, no rhonchi.    Cardiovascular: Regular rate and rhythm without murmur.   Neurologic: Grossly nonfocal. No facial asymmetry noted.  Lymph: No cervical lymphadenopathy noted.  Skin: Warm, dry, good turgor. No rashes in visible areas.   Psych: Normal mood. Alert and oriented to person, place and time.    Assessment/Plan:  Discussed likely viral etiology.  Vitals and exam are unremarkable.  Low suspicion for pneumonia. Patient will be tested for COVID and has been advised to quarantine until results are available. Discussed appropriate over-the-counter symptomatic medication, and when to return to clinic. Follow up for worsening or persistent symptoms.  1. Flu-like symptoms  SARS-CoV-2, PCR (In-House): Collect NP OR nasal swab in VTM    CoV-2 and Flu A/B by PCR (24 hour In-House): Collect NP swab in VTM   2. SOB (shortness of breath)  dexamethasone (DECADRON) 6 MG Tab    albuterol 108 (90 Base) MCG/ACT Aero Soln inhalation aerosol       "

## 2021-08-03 ENCOUNTER — PATIENT MESSAGE (OUTPATIENT)
Dept: MEDICAL GROUP | Facility: PHYSICIAN GROUP | Age: 41
End: 2021-08-03

## 2021-08-03 LAB
FLUAV RNA SPEC QL NAA+PROBE: NEGATIVE
FLUBV RNA SPEC QL NAA+PROBE: NEGATIVE
RSV RNA SPEC QL NAA+PROBE: NEGATIVE
SARS-COV-2 RNA RESP QL NAA+PROBE: DETECTED
SPECIMEN SOURCE: ABNORMAL

## 2021-08-10 ENCOUNTER — PATIENT MESSAGE (OUTPATIENT)
Dept: MEDICAL GROUP | Facility: PHYSICIAN GROUP | Age: 41
End: 2021-08-10

## 2021-08-11 NOTE — TELEPHONE ENCOUNTER
From: Alexsandra Villeda  To: Nurse Practitioner Haleigh Perdue  Sent: 8/10/2021 11:09 AM PDT  Subject: Test Result Question    Good Afternoon,    I wanted to touch base about my ongoing struggle with Covid. Unexpectedly, this is kicking my butt. My symptoms aren’t getting worse, but very much not progressing. I have my HR involved and will need my primary care provider to provide feedback for my claim.   I know I’m past the 10 days, but would a virtual appointment be better or an in-person?     Thanks,  Alexsandra      ----- Message -----   From:Nurse Practitioner Haleigh Perdue   Sent:8/3/2021 6:53 PM PDT   To:Alexsandra Villeda   Subject:RE: Test Result Question    Alexsandra,    I'm so sorry to hear that you tested positive for Covid. Treatment for Covid is conservative. Take Tylenol and ibuprofen as needed for muscle aches, fever, and headaches. Make sure you stay adequately hydrated and keep up on your nutrition as best as possible. Quarantine at this time is recommended for 10 days per CDC guidelines. Because this is a virus you just have to allow time for it to run its course through your system. If you start becoming extremely short of breath, decreased oxygen saturation, or fever that does not decrease with Tylenol or ibuprofen you may consider going to the emergency department for further work-up and evaluation.    Best regards,  LISBETH Porter      ----- Message -----   From:Alexsandra Villeda   Sent:8/3/2021 12:16 PM PDT   To:Nurse Practitioner Haleigh Perdue   Subject:Test Result Question    Just received my positive test results for Covid. Aside from quarantine, is there anything I need to do?  Symptoms started on Monday 7/26, I’m now on day 8 with minimal improvement. Lots of rest and water.

## 2021-08-19 ENCOUNTER — TELEMEDICINE (OUTPATIENT)
Dept: MEDICAL GROUP | Facility: PHYSICIAN GROUP | Age: 41
End: 2021-08-19
Payer: COMMERCIAL

## 2021-08-19 VITALS — WEIGHT: 199 LBS | BODY MASS INDEX: 31.98 KG/M2 | HEIGHT: 66 IN

## 2021-08-19 DIAGNOSIS — U09.9 LONG COVID: ICD-10-CM

## 2021-08-19 PROCEDURE — 99212 OFFICE O/P EST SF 10 MIN: CPT | Mod: 95,CR | Performed by: NURSE PRACTITIONER

## 2021-08-19 NOTE — PROGRESS NOTES
"Virtual Visit: Established Patient   This visit was conducted via Zoom using secure and encrypted videoconferencing technology.   The patient was in a private location in the state of [place].    The patient's identity was confirmed and verbal consent was obtained for this virtual visit.    Subjective:   CC:   Chief Complaint   Patient presents with   • Follow-Up     COVID    • Letter for School/Work     return to work        Alexsandra Villeda is a 41 y.o. female presenting for evaluation and management of:    Long COVID  Positive COVID was on 7/26/2021 she has fulfilled her 10 day quarantine.  She reports that she continues to experience with fatigue, headache, and congestion.  She denies any fever, shortness of breath, or hypoxia.    Patient today is requesting a letter to return to work.  I do feel this is appropriate and a letter was provided.        ROS   General: Positive per HPI  Cardio: No palpitations, chest pains  Pulmonary: Positive per HPI  Neuro: Positive per HPI      Current medicines (including changes today)  Current Outpatient Medications   Medication Sig Dispense Refill   • albuterol 108 (90 Base) MCG/ACT Aero Soln inhalation aerosol Inhale 2 Puffs every 6 hours as needed for Shortness of Breath. 8.5 g 1   • albuterol 108 (90 Base) MCG/ACT Aero Soln inhalation aerosol Inhale 2 Puffs every four hours as needed for Shortness of Breath. 8 g 0   • propranolol (INDERAL) 10 MG Tab Take 1 tablet by mouth 3 times a day. 90 tablet 5   • escitalopram (LEXAPRO) 10 MG Tab Take 1 tablet by mouth every day. 90 tablet 3   • LORYNA 3-0.02 MG per tablet Take 1 tablet by mouth every day.       No current facility-administered medications for this visit.       Patient Active Problem List    Diagnosis Date Noted   • Long COVID 08/19/2021   • Major depressive disorder with current active episode 07/08/2021   • Anxiety         Objective:   Ht 1.676 m (5' 6\")   Wt 90.3 kg (199 lb)   BMI 32.12 kg/m²     Physical " Exam:  Constitutional: Alert, no distress, well-groomed.  Skin: No rashes in visible areas.  Eye: Round. Conjunctiva clear, lids normal. No icterus.   ENMT: Lips pink without lesions, good dentition, moist mucous membranes. Phonation normal.  Neck: No masses, no thyromegaly. Moves freely without pain.  Respiratory: Unlabored respiratory effort, no cough or audible wheeze  Psych: Alert and oriented x3, normal affect and mood.     Assessment and Plan:   The following treatment plan was discussed:     1. Long COVID  Discussed with patient that the symptoms can last anywhere for weeks to months.  Encouraged her to continue to stay hydrated as well as adequate nutrition.  Discussed with her to take it slow and do not go out and rushed back in to exercising.  Letter to return to work was provided at today's visit and was sent to my chart.  Did discuss with patient she is welcome to come in and we will printed out for her if needed.    Follow-up: Return if symptoms worsen or fail to improve.

## 2021-08-19 NOTE — ASSESSMENT & PLAN NOTE
Positive COVID was on 7/26/2021 she has fulfilled her 10 day quarantine.  She reports that she continues to experience with fatigue, headache, and congestion.  She denies any fever, shortness of breath, or hypoxia.    Patient today is requesting a letter to return to work.  I do feel this is appropriate and a letter was provided.

## 2021-08-19 NOTE — LETTER
[address] [address]     August 19, 2021    Patient: Alexsandra Villeda   YOB: 1980   Date of Visit: 8/19/2021       To Whom It May Concern:    Alexsandra Villeda was seen and treated in our department on 8/19/2021. Positive COVID was on 7/26/2021 she has fulfilled her 10 day quarantine. She is cleared per guidelines to returned to work at full duty.        Sincerely,         ANA Stiles.

## 2021-08-23 ENCOUNTER — PATIENT MESSAGE (OUTPATIENT)
Dept: MEDICAL GROUP | Facility: PHYSICIAN GROUP | Age: 41
End: 2021-08-23

## 2022-01-06 ENCOUNTER — APPOINTMENT (OUTPATIENT)
Dept: RADIOLOGY | Facility: IMAGING CENTER | Age: 42
End: 2022-01-06
Attending: NURSE PRACTITIONER
Payer: COMMERCIAL

## 2022-01-06 ENCOUNTER — OFFICE VISIT (OUTPATIENT)
Dept: MEDICAL GROUP | Facility: PHYSICIAN GROUP | Age: 42
End: 2022-01-06
Payer: COMMERCIAL

## 2022-01-06 VITALS
DIASTOLIC BLOOD PRESSURE: 80 MMHG | BODY MASS INDEX: 32.43 KG/M2 | RESPIRATION RATE: 18 BRPM | SYSTOLIC BLOOD PRESSURE: 112 MMHG | HEIGHT: 66 IN | HEART RATE: 76 BPM | WEIGHT: 201.8 LBS | TEMPERATURE: 97.6 F

## 2022-01-06 DIAGNOSIS — M79.672 LEFT FOOT PAIN: ICD-10-CM

## 2022-01-06 DIAGNOSIS — M25.512 CHRONIC LEFT SHOULDER PAIN: ICD-10-CM

## 2022-01-06 DIAGNOSIS — F32.1 CURRENT MODERATE EPISODE OF MAJOR DEPRESSIVE DISORDER WITHOUT PRIOR EPISODE (HCC): ICD-10-CM

## 2022-01-06 DIAGNOSIS — G89.29 CHRONIC LEFT SHOULDER PAIN: ICD-10-CM

## 2022-01-06 DIAGNOSIS — F41.9 ANXIETY: ICD-10-CM

## 2022-01-06 PROBLEM — T25.022A LEFT FOOT BURN: Status: ACTIVE | Noted: 2022-01-06

## 2022-01-06 PROCEDURE — 99214 OFFICE O/P EST MOD 30 MIN: CPT | Performed by: NURSE PRACTITIONER

## 2022-01-06 PROCEDURE — 73030 X-RAY EXAM OF SHOULDER: CPT | Mod: TC,FY,LT | Performed by: RADIOLOGY

## 2022-01-06 PROCEDURE — 73620 X-RAY EXAM OF FOOT: CPT | Mod: TC,FY,LT | Performed by: RADIOLOGY

## 2022-01-06 RX ORDER — NAPROXEN 500 MG/1
500 TABLET ORAL 2 TIMES DAILY WITH MEALS
Qty: 60 TABLET | Refills: 1 | Status: SHIPPED | OUTPATIENT
Start: 2022-01-06 | End: 2023-06-13

## 2022-01-06 ASSESSMENT — PATIENT HEALTH QUESTIONNAIRE - PHQ9: CLINICAL INTERPRETATION OF PHQ2 SCORE: 0

## 2022-01-06 NOTE — ASSESSMENT & PLAN NOTE
Injury was in June 2021while playing softball.  Patient denies any bruising to that time however did have superficial swelling to the pinky toe lateral side.    Patient was able to ambulate after injury.  This condition has waxed and waned over the past 7 months.  Pain level 0/8 out of 10, sharp and achy, intermittent.  Location: Dorsal and dorsum of left foot under the fifth digit.  Patient has not tried over-the-counter medications, or cryotherapy.  In December start wearing walking boot which does help with pain however she wears it intermittently.  Aggravating factors: First thing in the morning, weightbearing and walking.  Alleviating factors: Heat and walking boot

## 2022-01-06 NOTE — PROGRESS NOTES
"Chief Complaint   Patient presents with   • Foot Pain     Left side x june    • Shoulder Pain     Left side x june        Subjective:     HPI:   Alexsandra Villeda is a 41 y.o. female here to discuss the evaluation and management of:      Left foot pain  Injury back in June while playing softball.  Patient denies any bruising to that time however did have superficial swelling to the pinky toe lateral side.  Patient was able to ambulate after injury.  This condition has waxed and waned over the past 7 months.    Dec started lisa a boot and it has help.   Not taking OTC meds  Has heat helps, no Ctyot   Pain: 0-8/10, sharp achy, ittermentant   Wear with baring weight and walking  Walking and heat helps.   Worse in the AM.     Shoulder pain, left  Crosse fit injury last mach were she felt a \"pulling tear\" and she dropped her weights.   Pain: 1-10/10, achy \"strain\" pain/tingling that runs down her left arm on occ.  Constant ache however tingling comes and goes.    Better:   Has not tried heat, ice, and no OTC meds tried.     Rasing arm more then 90 degrees, limiting any weight.   Pain with rotation.     Major depressive disorder with current active episode  Depression Screen (PHQ-2/PHQ-9) 6/24/2021 7/8/2021 1/6/2022   PHQ-2 Total Score 0 2 0   PHQ-9 Total Score - 12 -       Interpretation of PHQ-9 Total Score   Score Severity   1-4 No Depression   5-9 Mild Depression   10-14 Moderate Depression   15-19 Moderately Severe Depression   20-27 Severe Depression    This is an improving condition.  Patient reports that she weaned herself off her Lexapro and has not needed her propanolol for her depression anxiety symptoms.  She denies any suicidal ideations today and indicates that she has no symptoms of depression.    Anxiety  Patient weaned herself off Lexapro and has not taking propanolol as she reports her anxiety depression symptoms have subsided.  Please see additional notes in major depressive " disorder.          ROS:  Per HPI    Allergies   Allergen Reactions   • Cillins [Penicillins]      Rash   • Diazepam      Face swells up   • Morphine      Odd welts        Current medicines (including changes today)  Current Outpatient Medications   Medication Sig Dispense Refill   • naproxen (NAPROSYN) 500 MG Tab Take 1 Tablet by mouth 2 times a day with meals. 60 Tablet 1   • albuterol 108 (90 Base) MCG/ACT Aero Soln inhalation aerosol Inhale 2 Puffs every 6 hours as needed for Shortness of Breath. 8.5 g 1   • albuterol 108 (90 Base) MCG/ACT Aero Soln inhalation aerosol Inhale 2 Puffs every four hours as needed for Shortness of Breath. 8 g 0   • LORYNA 3-0.02 MG per tablet Take 1 tablet by mouth every day.       No current facility-administered medications for this visit.       Social History     Tobacco Use   • Smoking status: Never Smoker   • Smokeless tobacco: Never Used   Vaping Use   • Vaping Use: Never used   Substance Use Topics   • Alcohol use: Yes     Comment: rare   • Drug use: Never       Patient Active Problem List    Diagnosis Date Noted   • Left foot pain 01/06/2022   • Shoulder pain, left 01/06/2022   • Long COVID 08/19/2021   • Major depressive disorder with current active episode 07/08/2021   • Anxiety        Family History   Problem Relation Age of Onset   • Obesity Mother    • Hypertension Mother    • Heart Attack Father         at age 42   • Hypertension Father    • Hypertension Sister    • Cancer Sister         Cervical and ovarian          Objective:     No visits with results within 1 Month(s) from this visit.   Latest known visit with results is:   Hospital Outpatient Visit on 08/02/2021   Component Date Value Ref Range Status   • Influenza virus A RNA 08/02/2021 Negative  Negative Final   • Influenza virus B, PCR 08/02/2021 Negative  Negative Final   • RSV, PCR 08/02/2021 Negative  Negative Final   • SARS-CoV-2 by PCR 08/02/2021 DETECTED*  Final    Comment: PATIENTS: Important information  "regarding your results and instructions can  be found at [email or web address]   \"After your  Covid-19 Test\"    **The [facility] GeneXpert Xpress SARS-CoV-2 RT-PCR Test has been made  available for use under the Emergency Use Authorization (EUA) only.     • SARS-CoV-2 Source 08/02/2021 Nasal Swab   Final       /80 (BP Location: Left arm, Patient Position: Sitting, BP Cuff Size: Adult long)   Pulse 76   Temp 36.4 °C (97.6 °F) (Temporal)   Resp 18   Ht 1.676 m (5' 6\")   Wt 91.5 kg (201 lb 12.8 oz)  Body mass index is 32.57 kg/m².    Physical Exam:  Constitutional: Well-developed and well-nourished female in NAD. Not diaphoretic. No distress.   Skin: warm, dry, intact, no evidence of rash or concerning lesions  Head: Atraumatic without lesions.  Eyes: Conjunctivae and extraocular motions are normal. Pupils are equal, round, and reactive to light. No scleral icterus.   Cardiovascular: Regular rate and rhythm without murmur. Radial pulses are intact and equal bilaterally.  Pulmonary: Clear to ausculation. Normal effort. No rales, ronchi, or wheezing.  Left Shoulder: Decreased range of motion with abduction only able to do 90 degrees without pain, mildly decreased strength due to pain, empty can test positive.  MSK: Patient is able to bear weight on Left foot. Slightly antalgic gait.  Left foot: No noticeable deformity, swelling or bruising. ROM intact. Tenderness to palpation on dorsal and dorsum side of foot pad near fifth digit. 5/5 strength bilaterally. Sensation intact. 2+ pedal pulses. Ankle reflex 2+.   Extremities: No cyanosis, clubbing, erythema, nor edema.   Neurological: Alert and oriented x 3. No cranial nerve deficit. 5/5 myotomes. Sensation intact.   Psychiatric:  Behavior, mood, and affect are appropriate.    Left Foot X-ray    1/6/2022 8:57 AM     HISTORY/REASON FOR EXAM:  Left foot pain        TECHNIQUE/EXAM DESCRIPTION AND NUMBER OF VIEWS:  2 nonweightbearing views of " the LEFT foot.     COMPARISON:  No comparison available     FINDINGS:  Bone mineralization is normal.  There is no evidence of fracture or dislocation.  Soft tissues are normal.     IMPRESSION:     No evidence of fracture or dislocation.    Left shoulder X-ray    1/6/2022 8:57 AM     HISTORY/REASON FOR EXAM:  Left shoulder pain        TECHNIQUE/EXAM DESCRIPTION AND NUMBER OF VIEWS:  3 views of the LEFT shoulder.     COMPARISON: None     FINDINGS:  Bone mineralization is normal.  There is no evidence of acute fracture.  There is no evidence of dislocation.  There is no evidence of arthropathy.  No abnormalities are identified in the soft tissues.     IMPRESSION:     There is no evidence of acute fracture.      Assessment and Plan:     The following treatment plan was discussed:  Reviewed care gaps and patient declined any vaccines today.  She is aware the risk associated in doing so.  Reminded patient that she is due for her mammogram and Pap and encouraged her to schedule this.    Reviewed foot and shoulder x-rays which both indicated no abnormalities.  MA instructed to call patient with results.    1. Left foot pain  Is newly assessed condition.  Discussed with patient multiple differential diagnosis including arthritis, foot strain, or sprain.  Education was provided about use of cryo and/or heat therapy.  Patient will start taking naproxen 500 mg twice daily.  Education was provided to always take naproxen with food.  Patient was agreeable for physical therapy.  Orders placed indicated below.  Follow-up in 6 weeks if symptoms do not improve post PD.  - naproxen (NAPROSYN) 500 MG Tab; Take 1 Tablet by mouth 2 times a day with meals.  Dispense: 60 Tablet; Refill: 1  - Referral to Physical Therapy  - DX-FOOT-2- LEFT; Future    2. Chronic left shoulder pain  Is a newly assessed condition.  Discussed with patient multiple differential diagnoses included but not limited to rotator cuff tear, rotator cuff strain, nerve  impingement, or bursitis.  With shared decision making patient would like to have referral placed for orthopedics and physical therapy.  I do feel this is appropriate and referral was placed today.  Patient is educated about use of heat and cryotherapy.  Patient was also start trial of naproxen.    - naproxen (NAPROSYN) 500 MG Tab; Take 1 Tablet by mouth 2 times a day with meals.  Dispense: 60 Tablet; Refill: 1  - DX-SHOULDER 2+ LEFT; Future  - Referral to Physical Therapy  - Referral to Orthopedics    3. Current moderate episode of major depressive disorder without prior episode (HCC)  We will continue to monitor at future appointments.  Currently stable.  No medications at this time.    4. Anxiety      Any change or worsening of signs or symptoms, patient encouraged to follow-up or report to emergency room for further evaluation. Patient verbalizes understanding and agrees.    Follow-Up: Return in about 6 weeks (around 2/17/2022), or if symptoms worsen or fail to improve.      PLEASE NOTE: This dictation was created using voice recognition software. I have made every reasonable attempt to correct obvious errors, but I expect that there are errors of grammar and possibly content that I did not discover before finalizing the note.

## 2022-01-06 NOTE — PATIENT INSTRUCTIONS
Shoulder Pain  Many things can cause shoulder pain, including:  · An injury.  · Moving the shoulder in the same way again and again (overuse).  · Joint pain (arthritis).  Pain can come from:  · Swelling and irritation (inflammation) of any part of the shoulder.  · An injury to the shoulder joint.  · An injury to:  ? Tissues that connect muscle to bone (tendons).  ? Tissues that connect bones to each other (ligaments).  ? Bones.  Follow these instructions at home:  Watch for changes in your symptoms. Let your doctor know about them. Follow these instructions to help with your pain.  If you have a sling:  · Wear the sling as told by your doctor. Remove it only as told by your doctor.  · Loosen the sling if your fingers:  ? Tingle.  ? Become numb.  ? Turn cold and blue.  · Keep the sling clean.  · If the sling is not waterproof:  ? Do not let it get wet.  ? Take the sling off when you shower or bathe.  Managing pain, stiffness, and swelling    · If told, put ice on the painful area:  ? Put ice in a plastic bag.  ? Place a towel between your skin and the bag.  ? Leave the ice on for 20 minutes, 2-3 times a day. Stop putting ice on if it does not help with the pain.  · Squeeze a soft ball or a foam pad as much as possible. This prevents swelling in the shoulder. It also helps to strengthen the arm.  General instructions  · Take over-the-counter and prescription medicines only as told by your doctor.  · Keep all follow-up visits as told by your doctor. This is important.  Contact a doctor if:  · Your pain gets worse.  · Medicine does not help your pain.  · You have new pain in your arm, hand, or fingers.  Get help right away if:  · Your arm, hand, or fingers:  ? Tingle.  ? Are numb.  ? Are swollen.  ? Are painful.  ? Turn white or blue.  Summary  · Shoulder pain can be caused by many things. These include injury, moving the shoulder in the same away again and again, and joint pain.  · Watch for changes in your symptoms.  Let your doctor know about them.  · This condition may be treated with a sling, ice, and pain medicine.  · Contact your doctor if the pain gets worse or you have new pain. Get help right away if your arm, hand, or fingers tingle or get numb, swollen, or painful.  · Keep all follow-up visits as told by your doctor. This is important.  This information is not intended to replace advice given to you by your health care provider. Make sure you discuss any questions you have with your health care provider.  Document Released: 06/05/2009 Document Revised: 07/02/2019 Document Reviewed: 07/02/2019  [employer] Patient Education © 2020 [employer]    Foot Pain  Many things can cause foot pain. Some common causes are:  · An injury.  · A sprain.  · Arthritis.  · Blisters.  · Bunions.  Follow these instructions at home:  Managing pain, stiffness, and swelling  If directed, put ice on the painful area:  · Put ice in a plastic bag.  · Place a towel between your skin and the bag.  · Leave the ice on for 20 minutes, 2-3 times a day.    Activity  · Do not stand or walk for long periods.  · Return to your normal activities as told by your health care provider. Ask your health care provider what activities are safe for you.  · Do stretches to relieve foot pain and stiffness as told by your health care provider.  · Do not lift anything that is heavier than 10 lb (4.5 kg), or the limit that you are told, until your health care provider says that it is safe. Lifting a lot of weight can put added pressure on your feet.  Lifestyle  · Wear comfortable, supportive shoes that fit you well. Do not wear high heels.  · Keep your feet clean and dry.  General instructions  · Take over-the-counter and prescription medicines only as told by your health care provider.  · Rub your foot gently.  · Pay attention to any changes in your symptoms.  · Keep all follow-up visits as told by your health care provider. This is important.  Contact a health care  provider if:  · Your pain does not get better after a few days of self-care.  · Your pain gets worse.  · You cannot stand on your foot.  Get help right away if:  · Your foot is numb or tingling.  · Your foot or toes are swollen.  · Your foot or toes turn white or blue.  · You have warmth and redness along your foot.  Summary  · Common causes of foot pain are injury, sprain, arthritis, blisters or bunions.  · Ice, medicines, and comfortable shoes may help foot pain.  · Contact your health care provider if your pain does not get better after a few days of self-care.  This information is not intended to replace advice given to you by your health care provider. Make sure you discuss any questions you have with your health care provider.  Document Released: 01/13/2017 Document Revised: 10/03/2019 Document Reviewed: 10/03/2019  [employer] Patient Education © 2020 [employer]

## 2022-01-06 NOTE — ASSESSMENT & PLAN NOTE
Patient weaned herself off Lexapro and has not taking propanolol as she reports her anxiety depression symptoms have subsided.  Please see additional notes in major depressive disorder.

## 2022-01-06 NOTE — ASSESSMENT & PLAN NOTE
"This is a newly assessed chronic condition.  Patient reports March 2020 she was doing [employer] lifting weights where she felt a \"pulling tear\" which caused her to drop her weights and yell in pain.  Location: Anterior side of left shoulder.  Pain: 1-10/10, achy \"strain\" pain/tingling that runs down her left arm on occ.  Constant ache however tingling comes and goes.  Aggravating factors: Rasing arm more then 90 degrees, limiting any weight. Pain with rotation.   Alleviating factors rest and not moving.  Has not tried heat, ice, and no OTC meds tried.   "

## 2022-01-06 NOTE — ASSESSMENT & PLAN NOTE
Depression Screen (PHQ-2/PHQ-9) 6/24/2021 7/8/2021 1/6/2022   PHQ-2 Total Score 0 2 0   PHQ-9 Total Score - 12 -       Interpretation of PHQ-9 Total Score   Score Severity   1-4 No Depression   5-9 Mild Depression   10-14 Moderate Depression   15-19 Moderately Severe Depression   20-27 Severe Depression    This is an improving condition.  Patient reports that she weaned herself off her Lexapro and has not needed her propanolol for her depression anxiety symptoms.  She denies any suicidal ideations today and indicates that she has no symptoms of depression.

## 2022-03-16 ENCOUNTER — PATIENT MESSAGE (OUTPATIENT)
Dept: MEDICAL GROUP | Facility: PHYSICIAN GROUP | Age: 42
End: 2022-03-16
Payer: COMMERCIAL

## 2022-03-16 DIAGNOSIS — N63.0 BREAST LUMP IN FEMALE: ICD-10-CM

## 2022-04-04 ENCOUNTER — OFFICE VISIT (OUTPATIENT)
Dept: URGENT CARE | Facility: PHYSICIAN GROUP | Age: 42
End: 2022-04-04
Payer: COMMERCIAL

## 2022-04-04 ENCOUNTER — HOSPITAL ENCOUNTER (OUTPATIENT)
Facility: MEDICAL CENTER | Age: 42
End: 2022-04-04
Attending: FAMILY MEDICINE
Payer: COMMERCIAL

## 2022-04-04 VITALS
OXYGEN SATURATION: 97 % | BODY MASS INDEX: 32.24 KG/M2 | HEART RATE: 72 BPM | SYSTOLIC BLOOD PRESSURE: 122 MMHG | WEIGHT: 200.6 LBS | HEIGHT: 66 IN | DIASTOLIC BLOOD PRESSURE: 78 MMHG | RESPIRATION RATE: 16 BRPM | TEMPERATURE: 97.6 F

## 2022-04-04 DIAGNOSIS — J02.9 SORE THROAT: ICD-10-CM

## 2022-04-04 LAB
INT CON NEG: NORMAL
INT CON POS: NORMAL
S PYO AG THROAT QL: NORMAL

## 2022-04-04 PROCEDURE — 0240U HCHG SARS-COV-2 COVID-19 NFCT DS RESP RNA 3 TRGT MIC: CPT

## 2022-04-04 PROCEDURE — 99214 OFFICE O/P EST MOD 30 MIN: CPT | Performed by: FAMILY MEDICINE

## 2022-04-04 PROCEDURE — 87880 STREP A ASSAY W/OPTIC: CPT | Performed by: FAMILY MEDICINE

## 2022-04-05 DIAGNOSIS — J02.9 SORE THROAT: ICD-10-CM

## 2022-04-06 LAB
FLUAV RNA SPEC QL NAA+PROBE: NEGATIVE
FLUBV RNA SPEC QL NAA+PROBE: NEGATIVE
SARS-COV-2 RNA RESP QL NAA+PROBE: NOTDETECTED
SPECIMEN SOURCE: NORMAL

## 2023-01-23 ENCOUNTER — OFFICE VISIT (OUTPATIENT)
Dept: URGENT CARE | Facility: PHYSICIAN GROUP | Age: 43
End: 2023-01-23
Payer: COMMERCIAL

## 2023-01-23 VITALS
TEMPERATURE: 96.8 F | RESPIRATION RATE: 18 BRPM | BODY MASS INDEX: 32.3 KG/M2 | WEIGHT: 201 LBS | HEART RATE: 74 BPM | HEIGHT: 66 IN | SYSTOLIC BLOOD PRESSURE: 120 MMHG | DIASTOLIC BLOOD PRESSURE: 60 MMHG | OXYGEN SATURATION: 98 %

## 2023-01-23 DIAGNOSIS — M25.562 ACUTE PAIN OF LEFT KNEE: ICD-10-CM

## 2023-01-23 PROCEDURE — 99213 OFFICE O/P EST LOW 20 MIN: CPT | Performed by: FAMILY MEDICINE

## 2023-01-24 NOTE — PROGRESS NOTES
"  Subjective:      43 y.o. female presents to urgent care for acute on chronic left knee pain.  States has had issues intermittently with her knee since she was very young, yesterday at the gym was doing some strength training and had pain to her left knee.  Pain is intermittent, comes with the forward movement of her leg or lifting her leg up, when the pain is there it is described \"releasing able with spikes\" currently rated 0/10.  She has been doing ibuprofen with good relief in symptoms.,    She denies any other questions or concerns at this time.    Current problem list, medication, and past medical/surgical history were reviewed in Epic.    ROS  See HPI     Objective:      /60   Pulse 74   Temp 36 °C (96.8 °F) (Temporal)   Resp 18   Ht 1.676 m (5' 6\")   Wt 91.2 kg (201 lb)   SpO2 98%   BMI 32.44 kg/m²     Physical Exam  Constitutional:       General: She is not in acute distress.     Appearance: She is not diaphoretic.   Cardiovascular:      Rate and Rhythm: Normal rate and regular rhythm.      Heart sounds: Normal heart sounds.   Pulmonary:      Effort: Pulmonary effort is normal. No respiratory distress.      Breath sounds: Normal breath sounds.   Musculoskeletal:      Comments: No discolorations or deformities noted to inspection of left knee.  Patella is freely mobile and nontender.  Anterior and posterior drawer negative.  Patient endorses pain with Thessaly.  Antalgic gait.   Neurological:      Mental Status: She is alert.   Psychiatric:         Mood and Affect: Affect normal.         Judgment: Judgment normal.     Assessment/Plan:     1. Acute pain of left knee  Referral to sports medicine has been placed.  Continue with brace, heat, Tylenol, and ibuprofen as needed for symptomatic relief  - Referral to Sports Medicine      Instructed to return to Urgent Care or nearest Emergency Department if symptoms fail to improve, for any change in condition, further concerns, or new concerning " symptoms. Patient states understanding of the plan of care and discharge instructions.    Millie Magallanes M.D.

## 2023-04-07 ENCOUNTER — TELEPHONE (OUTPATIENT)
Dept: HEALTH INFORMATION MANAGEMENT | Facility: OTHER | Age: 43
End: 2023-04-07
Payer: COMMERCIAL

## 2023-04-07 NOTE — TELEPHONE ENCOUNTER
OUTCOME: [facility]     PT NO LONGER NEEDING APPT.     ATTEMPT # 1.

## 2023-06-13 ENCOUNTER — OFFICE VISIT (OUTPATIENT)
Dept: MEDICAL GROUP | Facility: MEDICAL CENTER | Age: 43
End: 2023-06-13
Payer: COMMERCIAL

## 2023-06-13 VITALS
SYSTOLIC BLOOD PRESSURE: 118 MMHG | HEIGHT: 66 IN | OXYGEN SATURATION: 98 % | WEIGHT: 203 LBS | RESPIRATION RATE: 14 BRPM | TEMPERATURE: 97.5 F | BODY MASS INDEX: 32.62 KG/M2 | DIASTOLIC BLOOD PRESSURE: 74 MMHG | HEART RATE: 77 BPM

## 2023-06-13 DIAGNOSIS — R53.83 FATIGUE, UNSPECIFIED TYPE: ICD-10-CM

## 2023-06-13 DIAGNOSIS — R63.5 WEIGHT GAIN: ICD-10-CM

## 2023-06-13 DIAGNOSIS — Z13.6 ENCOUNTER FOR LIPID SCREENING FOR CARDIOVASCULAR DISEASE: ICD-10-CM

## 2023-06-13 DIAGNOSIS — Z13.220 ENCOUNTER FOR LIPID SCREENING FOR CARDIOVASCULAR DISEASE: ICD-10-CM

## 2023-06-13 PROCEDURE — 3078F DIAST BP <80 MM HG: CPT | Performed by: FAMILY MEDICINE

## 2023-06-13 PROCEDURE — 99214 OFFICE O/P EST MOD 30 MIN: CPT | Performed by: FAMILY MEDICINE

## 2023-06-13 PROCEDURE — 3074F SYST BP LT 130 MM HG: CPT | Performed by: FAMILY MEDICINE

## 2023-06-13 NOTE — PROGRESS NOTES
Alexsandra Villeda is a pleasant 43 y.o. female here to establish care.    HPI:   Problem   Fatigue    Reports chronic fatigue that has been going on for the last 2 years.  Started to develop fatigue after receiving her COVID-vaccine.  Follows with a dietitian as well as going to the gym.  Unsure if for the fatigue is coming from.  She has had extensive testing of her thyroid and everything is come back normal.  Patient is concerned that there is something being missed in regards to her thyroid.     Weight Gain    Has been going to a nutritionist and working out, but gaining weight and not loosing weight.  Has her thyroid checked and normal.   Note that there may be something going on with her hormones making it increasingly difficult for her to be able to lose weight.  Wants to do extensive labs check to make sure everything is okay.            Current medicines (including changes today)  Current Outpatient Medications   Medication Sig Dispense Refill    LORYNA 3-0.02 MG per tablet Take 1 tablet by mouth every day.       No current facility-administered medications for this visit.       Past Medical/ Surgical History  She  has a past medical history of Anemia, Anxiety, and Major depressive disorder with current active episode (7/8/2021).    She has no past medical history of Arrhythmia, Arthritis, Asthma, Cancer (HCC), Clotting disorder (HCC), COPD (chronic obstructive pulmonary disease) (HCC), Diabetes (HCC), GERD (gastroesophageal reflux disease), Heart attack (HCC), Heart murmur, HIV (human immunodeficiency virus infection) (HCC), Hyperlipidemia, Hypertension, Kidney disease, Migraine, Osteoporosis, Pulmonary emphysema (HCC), Seizure (HCC), Stroke (HCC), Substance abuse (HCC), or Thyroid disease.  She  has a past surgical history that includes primary c section.    Social History  Social History     Tobacco Use    Smoking status: Never    Smokeless tobacco: Never   Vaping Use    Vaping Use: Never used   Substance  "Use Topics    Alcohol use: Yes     Comment: once every few months    Drug use: Never     Social History     Social History Narrative    Not on file        Family History  Family History   Problem Relation Age of Onset    Diabetes Mother         2    Obesity Mother     Hypertension Mother     Obesity Father     Heart Attack Father         at age 42    Hypertension Father     Hypertension Sister     Cancer Sister         Cervical and ovarian    Hypertension Brother      Family Status   Relation Name Status    Mo  Alive    Fa      Sis  Alive    Bro  Alive    MGMo      MGFa      PGMo      PGFa           Objective:     /74 (BP Location: Left arm, Patient Position: Sitting, BP Cuff Size: Adult)   Pulse 77   Temp 36.4 °C (97.5 °F) (Temporal)   Resp 14   Ht 1.676 m (5' 6\")   Wt 92.1 kg (203 lb)   SpO2 98%  Body mass index is 32.77 kg/m².    Physical Exam  Constitutional:       General: She is not in acute distress.  HENT:      Head: Normocephalic and atraumatic.      Right Ear: Tympanic membrane and external ear normal.      Left Ear: Tympanic membrane and external ear normal.      Nose: No nasal deformity.      Mouth/Throat:      Lips: Pink.      Mouth: Mucous membranes are moist.      Pharynx: Oropharynx is clear. Uvula midline. No posterior oropharyngeal erythema.   Eyes:      General: Lids are normal.      Extraocular Movements: Extraocular movements intact.      Conjunctiva/sclera: Conjunctivae normal.      Pupils: Pupils are equal, round, and reactive to light.   Neck:      Trachea: Trachea normal.   Cardiovascular:      Rate and Rhythm: Normal rate and regular rhythm.      Heart sounds: Normal heart sounds. No murmur heard.     No friction rub. No gallop.   Pulmonary:      Effort: Pulmonary effort is normal. No accessory muscle usage.      Breath sounds: Normal breath sounds. No wheezing or rales.   Abdominal:      General: Bowel sounds are normal.      Palpations: " Abdomen is soft.      Tenderness: There is no abdominal tenderness.   Musculoskeletal:      Cervical back: Normal range of motion and neck supple.      Right lower leg: No edema.      Left lower leg: No edema.   Lymphadenopathy:      Cervical: No cervical adenopathy.   Skin:     General: Skin is warm and dry.      Findings: No rash.   Neurological:      General: No focal deficit present.      Mental Status: She is alert and oriented to person, place, and time. Mental status is at baseline.      GCS: GCS eye subscore is 4. GCS verbal subscore is 5. GCS motor subscore is 6.      Motor: No weakness.      Gait: Gait is intact.   Psychiatric:         Attention and Perception: Attention normal.         Mood and Affect: Mood and affect normal.         Speech: Speech normal.          Imaging:  No imaging    Labs:  No labs  Assessment and Plan:   The following treatment plan was discussed     Problem List Items Addressed This Visit       Fatigue     Chronic, unstable.  CAROL, inflammatory markers, CBC, CMP, vitamin D, B12 ordered for the patient to pinpoint where the fatigue is coming from.  Recommended to continue with cardiovascular exercise.         Relevant Orders    ANTI-NUCLEAR ANTIBODY SERUM    CRP QUANTITIVE (NON-CARDIAC)    Sed Rate    VITAMIN B12    VITAMIN D,25 HYDROXY (DEFICIENCY)    TSH+FREE T4    ESTIMATED GFR    CBC WITHOUT DIFFERENTIAL    CORTISOL - AM    ACTH    Comp Metabolic Panel    Weight gain     Chronic, unstable.  Extensive labs ordered to make sure everything is okay such as CBC, CMP, thyroid studies, inflammatory markers, CAROL.  Recommended that she continue to follow with nutritionist as well as as working out frequently.  May consider form health as potential option for the patient.          Other Visit Diagnoses       Encounter for lipid screening for cardiovascular disease        Relevant Orders    Lipid Profile           Followup: Return in about 4 weeks (around 7/11/2023), or if symptoms worsen  or fail to improve, for follow-up on labs, fatigue and weight gaine.        Please note that this dictation was created using voice recognition software. I have made every reasonable attempt to correct obvious errors, but I expect that there are errors of grammar and possibly content that I did not discover before finalizing the note.

## 2023-06-13 NOTE — ASSESSMENT & PLAN NOTE
Chronic, unstable.  CAROL, inflammatory markers, CBC, CMP, vitamin D, B12 ordered for the patient to pinpoint where the fatigue is coming from.  Recommended to continue with cardiovascular exercise.

## 2023-06-13 NOTE — ASSESSMENT & PLAN NOTE
Chronic, unstable.  Extensive labs ordered to make sure everything is okay such as CBC, CMP, thyroid studies, inflammatory markers, CAROL.  Recommended that she continue to follow with nutritionist as well as as working out frequently.  May consider form health as potential option for the patient.

## 2023-06-26 ENCOUNTER — HOSPITAL ENCOUNTER (OUTPATIENT)
Dept: LAB | Facility: MEDICAL CENTER | Age: 43
End: 2023-06-26
Attending: FAMILY MEDICINE
Payer: COMMERCIAL

## 2023-06-26 DIAGNOSIS — Z13.6 ENCOUNTER FOR LIPID SCREENING FOR CARDIOVASCULAR DISEASE: ICD-10-CM

## 2023-06-26 DIAGNOSIS — R53.83 FATIGUE, UNSPECIFIED TYPE: ICD-10-CM

## 2023-06-26 DIAGNOSIS — Z13.220 ENCOUNTER FOR LIPID SCREENING FOR CARDIOVASCULAR DISEASE: ICD-10-CM

## 2023-06-26 LAB
25(OH)D3 SERPL-MCNC: 45 NG/ML (ref 30–100)
CORTIS SERPL-MCNC: 26.2 UG/DL (ref 0–23)
ERYTHROCYTE [DISTWIDTH] IN BLOOD BY AUTOMATED COUNT: 40.7 FL (ref 35.9–50)
ERYTHROCYTE [SEDIMENTATION RATE] IN BLOOD BY WESTERGREN METHOD: 12 MM/HOUR (ref 0–25)
HCT VFR BLD AUTO: 45.8 % (ref 37–47)
HGB BLD-MCNC: 15.3 G/DL (ref 12–16)
MCH RBC QN AUTO: 30.5 PG (ref 27–33)
MCHC RBC AUTO-ENTMCNC: 33.4 G/DL (ref 32.2–35.5)
MCV RBC AUTO: 91.4 FL (ref 81.4–97.8)
PLATELET # BLD AUTO: 229 K/UL (ref 164–446)
PMV BLD AUTO: 13.4 FL (ref 9–12.9)
RBC # BLD AUTO: 5.01 M/UL (ref 4.2–5.4)
T4 FREE SERPL-MCNC: 1.09 NG/DL (ref 0.93–1.7)
TSH SERPL DL<=0.005 MIU/L-ACNC: 3.46 UIU/ML (ref 0.38–5.33)
VIT B12 SERPL-MCNC: 821 PG/ML (ref 211–911)
WBC # BLD AUTO: 7.5 K/UL (ref 4.8–10.8)

## 2023-06-26 PROCEDURE — 82024 ASSAY OF ACTH: CPT

## 2023-06-26 PROCEDURE — 82607 VITAMIN B-12: CPT

## 2023-06-26 PROCEDURE — 80053 COMPREHEN METABOLIC PANEL: CPT

## 2023-06-26 PROCEDURE — 82306 VITAMIN D 25 HYDROXY: CPT

## 2023-06-26 PROCEDURE — 86038 ANTINUCLEAR ANTIBODIES: CPT

## 2023-06-26 PROCEDURE — 86140 C-REACTIVE PROTEIN: CPT

## 2023-06-26 PROCEDURE — 85652 RBC SED RATE AUTOMATED: CPT

## 2023-06-26 PROCEDURE — 84443 ASSAY THYROID STIM HORMONE: CPT

## 2023-06-26 PROCEDURE — 36415 COLL VENOUS BLD VENIPUNCTURE: CPT

## 2023-06-26 PROCEDURE — 82533 TOTAL CORTISOL: CPT

## 2023-06-26 PROCEDURE — 85027 COMPLETE CBC AUTOMATED: CPT

## 2023-06-26 PROCEDURE — 80061 LIPID PANEL: CPT

## 2023-06-26 PROCEDURE — 84439 ASSAY OF FREE THYROXINE: CPT

## 2023-06-27 LAB
ALBUMIN SERPL BCP-MCNC: 4.5 G/DL (ref 3.2–4.9)
ALBUMIN/GLOB SERPL: 1.6 G/DL
ALP SERPL-CCNC: 56 U/L (ref 30–99)
ALT SERPL-CCNC: 21 U/L (ref 2–50)
ANION GAP SERPL CALC-SCNC: 13 MMOL/L (ref 7–16)
AST SERPL-CCNC: 15 U/L (ref 12–45)
BILIRUB SERPL-MCNC: 0.3 MG/DL (ref 0.1–1.5)
BUN SERPL-MCNC: 14 MG/DL (ref 8–22)
CALCIUM ALBUM COR SERPL-MCNC: 9.7 MG/DL (ref 8.5–10.5)
CALCIUM SERPL-MCNC: 10.1 MG/DL (ref 8.5–10.5)
CHLORIDE SERPL-SCNC: 103 MMOL/L (ref 96–112)
CHOLEST SERPL-MCNC: 237 MG/DL (ref 100–199)
CO2 SERPL-SCNC: 22 MMOL/L (ref 20–33)
CREAT SERPL-MCNC: 0.81 MG/DL (ref 0.5–1.4)
CRP SERPL HS-MCNC: 0.55 MG/DL (ref 0–0.75)
FASTING STATUS PATIENT QL REPORTED: NORMAL
GFR SERPLBLD CREATININE-BSD FMLA CKD-EPI: 93 ML/MIN/1.73 M 2
GLOBULIN SER CALC-MCNC: 2.8 G/DL (ref 1.9–3.5)
GLUCOSE SERPL-MCNC: 98 MG/DL (ref 65–99)
HDLC SERPL-MCNC: 58 MG/DL
LDLC SERPL CALC-MCNC: 134 MG/DL
POTASSIUM SERPL-SCNC: 3.7 MMOL/L (ref 3.6–5.5)
PROT SERPL-MCNC: 7.3 G/DL (ref 6–8.2)
SODIUM SERPL-SCNC: 138 MMOL/L (ref 135–145)
TRIGL SERPL-MCNC: 226 MG/DL (ref 0–149)

## 2023-06-28 LAB — ACTH PLAS-MCNC: 14 PG/ML (ref 7.2–63.3)

## 2023-06-29 LAB — NUCLEAR IGG SER QL IA: NORMAL

## 2023-07-13 ENCOUNTER — OFFICE VISIT (OUTPATIENT)
Dept: MEDICAL GROUP | Facility: MEDICAL CENTER | Age: 43
End: 2023-07-13
Payer: COMMERCIAL

## 2023-07-13 VITALS
WEIGHT: 200.62 LBS | TEMPERATURE: 98.6 F | DIASTOLIC BLOOD PRESSURE: 66 MMHG | SYSTOLIC BLOOD PRESSURE: 100 MMHG | BODY MASS INDEX: 31.49 KG/M2 | OXYGEN SATURATION: 97 % | HEIGHT: 67 IN | HEART RATE: 60 BPM | RESPIRATION RATE: 16 BRPM

## 2023-07-13 DIAGNOSIS — Z13.1 SCREENING FOR DIABETES MELLITUS: ICD-10-CM

## 2023-07-13 DIAGNOSIS — R79.89 ELEVATED CORTISOL LEVEL: ICD-10-CM

## 2023-07-13 DIAGNOSIS — Z13.0 SCREENING FOR DEFICIENCY ANEMIA: ICD-10-CM

## 2023-07-13 DIAGNOSIS — R53.83 FATIGUE, UNSPECIFIED TYPE: ICD-10-CM

## 2023-07-13 DIAGNOSIS — M54.6 CHRONIC MIDLINE THORACIC BACK PAIN: ICD-10-CM

## 2023-07-13 DIAGNOSIS — E78.2 MIXED HYPERLIPIDEMIA: ICD-10-CM

## 2023-07-13 DIAGNOSIS — G89.29 CHRONIC MIDLINE THORACIC BACK PAIN: ICD-10-CM

## 2023-07-13 DIAGNOSIS — Z11.59 ENCOUNTER FOR HEPATITIS C SCREENING TEST FOR LOW RISK PATIENT: ICD-10-CM

## 2023-07-13 PROCEDURE — 99214 OFFICE O/P EST MOD 30 MIN: CPT | Performed by: FAMILY MEDICINE

## 2023-07-13 PROCEDURE — 3078F DIAST BP <80 MM HG: CPT | Performed by: FAMILY MEDICINE

## 2023-07-13 PROCEDURE — 3074F SYST BP LT 130 MM HG: CPT | Performed by: FAMILY MEDICINE

## 2023-07-13 ASSESSMENT — PATIENT HEALTH QUESTIONNAIRE - PHQ9
6. FEELING BAD ABOUT YOURSELF - OR THAT YOU ARE A FAILURE OR HAVE LET YOURSELF OR YOUR FAMILY DOWN: NOT AL ALL
9. THOUGHTS THAT YOU WOULD BE BETTER OFF DEAD, OR OF HURTING YOURSELF: NOT AT ALL
5. POOR APPETITE OR OVEREATING: NOT AT ALL
8. MOVING OR SPEAKING SO SLOWLY THAT OTHER PEOPLE COULD HAVE NOTICED. OR THE OPPOSITE, BEING SO FIGETY OR RESTLESS THAT YOU HAVE BEEN MOVING AROUND A LOT MORE THAN USUAL: NOT AT ALL
2. FEELING DOWN, DEPRESSED, IRRITABLE, OR HOPELESS: NOT AT ALL
SUM OF ALL RESPONSES TO PHQ QUESTIONS 1-9: 0
1. LITTLE INTEREST OR PLEASURE IN DOING THINGS: NOT AT ALL
4. FEELING TIRED OR HAVING LITTLE ENERGY: NOT AT ALL
SUM OF ALL RESPONSES TO PHQ9 QUESTIONS 1 AND 2: 0
7. TROUBLE CONCENTRATING ON THINGS, SUCH AS READING THE NEWSPAPER OR WATCHING TELEVISION: NOT AT ALL
3. TROUBLE FALLING OR STAYING ASLEEP OR SLEEPING TOO MUCH: NOT AT ALL

## 2023-07-13 ASSESSMENT — FIBROSIS 4 INDEX: FIB4 SCORE: 0.6

## 2023-07-13 NOTE — PROGRESS NOTES
Alexsandra Villeda is a pleasant 43 y.o. female here for   Chief Complaint   Patient presents with    Follow-Up    Lab Results      HPI:   Problem   Chronic Midline Thoracic Back Pain    Chronic midline thoracic back discomfort that she has been struggling with for many years.  Feels that these are mostly related to her large breasts.  She is wondering what steps and processes that she should do to come to move forward with getting a breast reduction.  States that she has been dealing with this for many years, the only response that she continues to get from other providers is that she needs to lose weight which will cause an improvement in her symptoms.  States that she has lost weight in the past but no change in the weight of her breast.     Mixed Hyperlipidemia   Fatigue    Reports chronic fatigue that has been going on for the last 2 years.  Started B12 injections and has noticed improvement in some activity.  Also taking semaglutide to help with weight reduction.  Completed a full set of labs which all came back normal, no source for fatigue.          Current Medicines (including changes today)  Current Outpatient Medications   Medication Sig Dispense Refill    Semaglutide,0.25 or 0.5MG/DOS, 2 MG/3ML Solution Pen-injector Inject 0.5 mg under the skin every 7 days.      LORYNA 3-0.02 MG per tablet Take 1 tablet by mouth every day.       No current facility-administered medications for this visit.     Past Medical/ Surgical History  She  has a past medical history of Anemia, Anxiety, Asthma, Major depressive disorder with current active episode (7/8/2021), Mixed hyperlipidemia (7/13/2023), and Tobacco use.    She has no past medical history of Arrhythmia, Arthritis, Asthma, Cancer ([facility]), Clotting disorder ([facility]), COPD (chronic obstructive pulmonary disease) ([facility]), Diabetes ([facility]), GERD (gastroesophageal reflux disease), Heart attack ([facility]), Heart murmur, HIV (human immunodeficiency virus infection) ([facility]),  "Hypertension, Kidney disease, Migraine, Osteoporosis, Pulmonary emphysema (HCC), Seizure (HCC), Stroke (HCC), Substance abuse (HCC), or Thyroid disease.  She  has a past surgical history that includes primary c section and primary c section.     Objective:     /66 (Patient Position: Sitting)   Pulse 60   Temp 37 °C (98.6 °F) (Temporal)   Resp 16   Ht 1.689 m (5' 6.5\")   Wt 91 kg (200 lb 9.9 oz)   SpO2 97%  Body mass index is 31.9 kg/m².    Physical exam was made by observation   Physical Exam  Constitutional:       General: She is not in acute distress.  HENT:      Head: Normocephalic and atraumatic.   Eyes:      Conjunctiva/sclera: Conjunctivae normal.      Pupils: Pupils are equal, round, and reactive to light.   Pulmonary:      Effort: Pulmonary effort is normal. No respiratory distress.   Abdominal:      General: There is no distension.   Musculoskeletal:      Cervical back: Normal range of motion and neck supple.   Skin:     General: Skin is warm and dry.      Findings: No rash.   Neurological:      Mental Status: She is alert and oriented to person, place, and time.      Gait: Gait is intact.   Psychiatric:         Mood and Affect: Affect normal.          Imaging:  No imaging    Labs  Recent labs from 06/26/2023 reviewed with the patient.   Assessment and Plan:   The following treatment plan was discussed     Problem List Items Addressed This Visit       Fatigue     Chronic, improving.  Continue with B12 injections, continue with semaglutide for weight reduction.  We will continue to monitor cortisol level.  Did review the labs with the patient.         Chronic midline thoracic back pain     Chronic, unstable.  Okay to continue ibuprofen 600 mg every 6 hours as needed.  Referral to physiatry for evaluation.         Relevant Orders    Referral to Pain Clinic    Mixed hyperlipidemia    Relevant Orders    Lipid Profile     Other Visit Diagnoses       Elevated cortisol level        Relevant Orders    " CORTISOL - AM    Screening for diabetes mellitus        Relevant Orders    Comp Metabolic Panel    ESTIMATED GFR    Screening for deficiency anemia        Relevant Orders    CBC WITH DIFFERENTIAL    Encounter for hepatitis C screening test for low risk patient        Relevant Orders    HEP C VIRUS ANTIBODY             Followup: Return in about 1 year (around 7/13/2024) for Annual.    Please note that this dictation was created using voice recognition software. I have made every reasonable attempt to correct obvious errors, but I expect that there are errors of grammar and possibly content that I did not discover before finalizing the note.

## 2023-07-13 NOTE — ASSESSMENT & PLAN NOTE
Chronic, unstable.  Okay to continue ibuprofen 600 mg every 6 hours as needed.  Referral to physiatry for evaluation.

## 2023-07-13 NOTE — ASSESSMENT & PLAN NOTE
Chronic, improving.  Continue with B12 injections, continue with semaglutide for weight reduction.  We will continue to monitor cortisol level.  Did review the labs with the patient.

## 2023-08-14 ENCOUNTER — PATIENT MESSAGE (OUTPATIENT)
Dept: MEDICAL GROUP | Facility: MEDICAL CENTER | Age: 43
End: 2023-08-14

## 2023-08-14 ENCOUNTER — OFFICE VISIT (OUTPATIENT)
Dept: PHYSICAL MEDICINE AND REHAB | Facility: MEDICAL CENTER | Age: 43
End: 2023-08-14
Payer: COMMERCIAL

## 2023-08-14 VITALS
SYSTOLIC BLOOD PRESSURE: 112 MMHG | TEMPERATURE: 98.2 F | HEART RATE: 73 BPM | BODY MASS INDEX: 30.38 KG/M2 | HEIGHT: 67 IN | DIASTOLIC BLOOD PRESSURE: 70 MMHG | WEIGHT: 193.56 LBS | OXYGEN SATURATION: 97 %

## 2023-08-14 DIAGNOSIS — Z71.82 EXERCISE COUNSELING: ICD-10-CM

## 2023-08-14 DIAGNOSIS — M54.6 CHRONIC BILATERAL THORACIC BACK PAIN: ICD-10-CM

## 2023-08-14 DIAGNOSIS — N62 LARGE BREASTS: ICD-10-CM

## 2023-08-14 DIAGNOSIS — E66.9 OBESITY (BMI 30-39.9): ICD-10-CM

## 2023-08-14 DIAGNOSIS — M54.6 CHRONIC MIDLINE THORACIC BACK PAIN: ICD-10-CM

## 2023-08-14 DIAGNOSIS — Z74.09 IMPAIRED MOBILITY AND ADLS: ICD-10-CM

## 2023-08-14 DIAGNOSIS — N64.9 BREAST DISORDER: ICD-10-CM

## 2023-08-14 DIAGNOSIS — Z78.9 IMPAIRED MOBILITY AND ADLS: ICD-10-CM

## 2023-08-14 DIAGNOSIS — G89.29 CHRONIC BILATERAL THORACIC BACK PAIN: ICD-10-CM

## 2023-08-14 DIAGNOSIS — G89.29 CHRONIC MIDLINE THORACIC BACK PAIN: ICD-10-CM

## 2023-08-14 PROCEDURE — 99204 OFFICE O/P NEW MOD 45 MIN: CPT | Performed by: PHYSICAL MEDICINE & REHABILITATION

## 2023-08-14 PROCEDURE — 1125F AMNT PAIN NOTED PAIN PRSNT: CPT | Performed by: PHYSICAL MEDICINE & REHABILITATION

## 2023-08-14 PROCEDURE — 3074F SYST BP LT 130 MM HG: CPT | Performed by: PHYSICAL MEDICINE & REHABILITATION

## 2023-08-14 PROCEDURE — 3078F DIAST BP <80 MM HG: CPT | Performed by: PHYSICAL MEDICINE & REHABILITATION

## 2023-08-14 ASSESSMENT — PATIENT HEALTH QUESTIONNAIRE - PHQ9: CLINICAL INTERPRETATION OF PHQ2 SCORE: 0

## 2023-08-14 ASSESSMENT — PAIN SCALES - GENERAL: PAINLEVEL: 6=MODERATE PAIN

## 2023-08-14 ASSESSMENT — FIBROSIS 4 INDEX: FIB4 SCORE: 0.6

## 2023-08-14 NOTE — PROGRESS NOTES
New patient note    Interventional spine and Pain  Physiatry (physical medicine and  Rehabilitation)     Date of service: See epic    Chief complaint:   Chief Complaint   Patient presents with    Back Pain     Chronic midline thoracic back pain        Referring provider: Aide Marcial A.P.R*     HISTORY    HPI: Alexsandra Villeda 43 y.o.  who presents today with Diagnoses of Chronic bilateral thoracic back pain, Impaired mobility and ADLs, Breast disorder, Obesity (BMI 30-39.9), and Exercise counseling were pertinent to this visit.    HPI    Chronic mid thoracic back pain present for over 10 years. Gradually worsening, 6/10 in intensity.  She is limited with running and other exercises because of these pains.  She has tried wearing multiple sports bras. She feels that the pain is worse because of her breasts and she is interested in a breast reduction.      Has done conservative care including chiropractic care, physical therapy, has been working on losing weight, medication management.     Medical records review:  I reviewed the note from the referring provider Aide Marcial A.P.R* including the note dated 7/13/2023.           ROS:   Red Flags ROS:   Fever, Chills, Sweats: Denies  Involuntary Weight Loss: Denies  Bladder Incontinence: Denies  Bowel Incontinence: denies  Saddle Anesthesia: Denies    All other systems reviewed and negative.       PMHx:   Past Medical History:   Diagnosis Date    Anemia     during preg    Anxiety     Asthma     Major depressive disorder with current active episode 7/8/2021    Mixed hyperlipidemia 7/13/2023    Tobacco use          Current Outpatient Medications on File Prior to Visit   Medication Sig Dispense Refill    Semaglutide,0.25 or 0.5MG/DOS, 2 MG/3ML Solution Pen-injector Inject 0.5 mg under the skin every 7 days.      LORYNA 3-0.02 MG per tablet Take 1 tablet by mouth every day.       No current facility-administered medications on file prior to visit.        PSHx:    Past Surgical History:   Procedure Laterality Date    PRIMARY C SECTION      PRIMARY C SECTION      x 2       Family history   Family History   Problem Relation Age of Onset    Diabetes Mother         2    Obesity Mother     Hypertension Mother     Obesity Father     Heart Attack Father         at age 42    Hypertension Father     Hypertension Sister     Cancer Sister         Cervical and ovarian    Hypertension Brother     Heart Disease Mother     Hypertension Sister          Medications: reviewed on epic.   Outpatient Medications Marked as Taking for the 8/14/23 encounter (Office Visit) with Thomas Norwood M.D.   Medication Sig Dispense Refill    Semaglutide,0.25 or 0.5MG/DOS, 2 MG/3ML Solution Pen-injector Inject 0.5 mg under the skin every 7 days.      LORYNA 3-0.02 MG per tablet Take 1 tablet by mouth every day.          Allergies:   Allergies   Allergen Reactions    Amoxicillin Shortness of Breath    Cillins [Penicillins]      Rash    Darvocet [Propoxyphene N-Apap] Rash and Vomiting     .    Diazepam      Face swells up    Morphine Rash    Morphine      Odd welts     Pcn [Penicillins] Shortness of Breath     All cillins    Sulfa Drugs Shortness of Breath    Valium Swelling       Social Hx:   Social History     Socioeconomic History    Marital status: Single     Spouse name: Not on file    Number of children: Not on file    Years of education: Not on file    Highest education level: Not on file   Occupational History    Not on file   Tobacco Use    Smoking status: Never    Smokeless tobacco: Never   Vaping Use    Vaping Use: Never used   Substance and Sexual Activity    Alcohol use: Yes     Comment: once every few months    Drug use: Never    Sexual activity: Yes     Partners: Male     Comment: Single   Other Topics Concern    Not on file   Social History Narrative    ** Merged History Encounter **          Social Determinants of Health     Financial Resource Strain: Not on file   Food Insecurity: Not on  "file   Transportation Needs: Not on file   Physical Activity: Not on file   Stress: Not on file   Social Connections: Not on file   Intimate Partner Violence: Not on file   Housing Stability: Not on file         EXAMINATION     Physical Exam:   Vitals: /70 (BP Location: Right arm, Patient Position: Sitting, BP Cuff Size: Adult)   Pulse 73   Temp 36.8 °C (98.2 °F) (Temporal)   Ht 1.702 m (5' 7\")   Wt 87.8 kg (193 lb 9 oz)   SpO2 97%     Constitutional:   Body Habitus: Body mass index is 30.32 kg/m².  Cooperation: Fully cooperates with exam  Appearance: Well-groomed, well-nourished, not disheveled     Eyes: No scleral icterus to suggest severe liver disease, no proptosis to suggest severe hyperthyroid    ENT -no obvious auditory deficits, no obvious tongue lesions, tongue midline, no facial droop     Skin -no rashes or lesions noted     Respiratory-  breathing comfortable on room air, no audible wheezing    Cardiovascular- capillary refills less than 2 seconds.     Psychiatric- alert and oriented ×3. Normal affect.     Gait - normal gait, no use of ambulatory device, nonantalgic.     Musculoskeletal and Neuro -       Cervical spine   Inspection: No deformities of the skin over the cervical spine. No rashes or lesions.    full   active range of motion in all directions, without  pain      Spurling’s sign: negative bilaterally    No signs of muscular atrophy in bilateral upper extremities     No tenderness to palpation of the cervical spine        Key points for the international standards for neurological classification of spinal cord injury (ISNCSCI) to light touch.     Dermatome R L   C4 2 2   C5 2 2   C6 2 2   C7 2 2   C8 2 2   T1 2 2   T2 2 2                                     Motor Exam Upper Extremities   ? Myotome R L   Shoulder flexion C5 5 5   Elbow flexion C5 5 5   Wrist extension C6 5 5   Elbow extension C7 5 5   Finger flexion C8 5 5   Finger abduction T1 5 5     Reflexes  ?  R L   Biceps  2+ 2+ "   Brachioradialis  2+ 2+     Alonzo’s sign negative bilaterally            Thoracic/Lumbar Spine/Sacral Spine/Hips   Inspection: Thoracic kyphosis.  No evidence of atrophy in bilateral lower extremities throughout     ROM: decreased active range of motion with flexion, lateral flexion, and rotation bilaterally.   There is decreased active range of motion with lumbar extension.      Palpation:   No tenderness to palpation in midline at T1-T12 levels. No tenderness to palpation in the left and right of the midline T1-L5, NEGATIVE for tenderness to palpation to the para-midline region in the lower lumbar levels.  palpation over SI joint: negative bilaterally    palpation in hip or over the gluteus medius tendon insertion: negative bilaterally      Lumbar spine Special tests  Neuro tension  Straight leg test negative bilaterally    Slump test negative bilaterally      HIP  FAIR test     Range of motion in the RIGHT hip is full  in flexion, extension, abduction, internal rotation, external rotation.  Range of motion in the LEFT hip is full  in flexion, extension, abduction, internal rotation, external rotation.      SI joint tests  Observation patient sits on one buttocks: Negative  SI joint compression negative bilaterally    SI joint distraction negative bilaterally    Thigh thrust test negative bilaterally    AMITA test negative bilaterally                 Key points for the international standards for neurological classification of spinal cord injury (ISNCSCI) to light touch.     Dermatome R L                                      L2 2 2   L3 2 2   L4 2 2   L5 2 2   S1 2 2   S2 2 2       Motor Exam Lower Extremities    ? Myotome R L   Hip flexion L2 5 5   Knee extension L3 5 5   Ankle dorsiflexion L4 5 5   Toe extension L5 5 5   Ankle plantarflexion S1 5 5         Reflexes  ?  R L                Patella  2+ 2+   Achilles   2+ 2+       Babinski sign negative bilaterally   Clonus of the ankle negative bilaterally        MEDICAL DECISION MAKING    Medical records review: see under HPI section.     DATA    Labs:   Lab Results   Component Value Date/Time    SODIUM 138 06/26/2023 01:37 PM    POTASSIUM 3.7 06/26/2023 01:37 PM    CHLORIDE 103 06/26/2023 01:37 PM    CO2 22 06/26/2023 01:37 PM    ANION 13.0 06/26/2023 01:37 PM    GLUCOSE 98 06/26/2023 01:37 PM    BUN 14 06/26/2023 01:37 PM    CREATININE 0.81 06/26/2023 01:37 PM    CALCIUM 10.1 06/26/2023 01:37 PM    ASTSGOT 15 06/26/2023 01:37 PM    ALTSGPT 21 06/26/2023 01:37 PM    TBILIRUBIN 0.3 06/26/2023 01:37 PM    ALBUMIN 4.5 06/26/2023 01:37 PM    TOTPROTEIN 7.3 06/26/2023 01:37 PM    GLOBULIN 2.8 06/26/2023 01:37 PM    AGRATIO 1.6 06/26/2023 01:37 PM   ]    No results found for: PROTHROMBTM, INR     Lab Results   Component Value Date/Time    WBC 7.5 06/26/2023 01:37 PM    RBC 5.01 06/26/2023 01:37 PM    HEMOGLOBIN 15.3 06/26/2023 01:37 PM    HEMATOCRIT 45.8 06/26/2023 01:37 PM    MCV 91.4 06/26/2023 01:37 PM    MCH 30.5 06/26/2023 01:37 PM    MCHC 33.4 06/26/2023 01:37 PM    MPV 13.4 (H) 06/26/2023 01:37 PM    NEUTSPOLYS 77.70 (H) 02/11/2020 11:46 AM    LYMPHOCYTES 15.20 (L) 02/11/2020 11:46 AM    MONOCYTES 4.80 02/11/2020 11:46 AM    EOSINOPHILS 1.60 02/11/2020 11:46 AM    BASOPHILS 0.30 02/11/2020 11:46 AM        No results found for: HBA1C     Imaging:   I personally reviewed following images, these are my reads    X-ray left shoulder 1/6/2022  No acute fractures.  No significant degenerative changes in the shoulder.      IMAGING radiology reads. I reviewed the following radiology reads                                                                          Results for orders placed in visit on 01/06/22    DX-FOOT-2- LEFT    Impression  No evidence of fracture or dislocation.   Results for orders placed during the hospital encounter of 01/31/19    DX-FOOT-COMPLETE 3+ LEFT    Impression  1.  There is no fracture or malalignment of the left foot.                           Results for orders placed in visit on 01/06/22    DX-SHOULDER 2+ LEFT    Impression  There is no evidence of acute fracture.              Diagnosis   Visit Diagnoses     ICD-10-CM   1. Chronic bilateral thoracic back pain  M54.6    G89.29   2. Impaired mobility and ADLs  Z74.09    Z78.9   3. Breast disorder  N64.9   4. Obesity (BMI 30-39.9)  E66.9   5. Exercise counseling  Z71.82           ASSESSMENT AND PLAN:  Alexsandra Villeda 43 y.o. female      Alexsandra was seen today for back pain.    Diagnoses and all orders for this visit:    Chronic bilateral thoracic back pain    Impaired mobility and ADLs    Breast disorder    Obesity (BMI 30-39.9)    Exercise counseling      The patient is had chronic thoracic back pain for greater than 10 years which is likely postural.  It is difficult for the patient to exercise and have improved posture given large breast tissue.  The patient is interested in a breast reduction which I believe is reasonable and would likely help with thoracic back pain and posture.  The patient has had extensive conservative treatments described above with no significant improvement.  Pain and dysfunction are gradually worsening.    I do not believe that injections or procedures would be helpful on the spine itself.    Medications: Acetaminophen up to 1000 mg 3 times daily as needed not to exceed 3000 mg per 24-hours.    Follow-up: I am referring the patient back to the referring provider During the evaluation, a referral was discussed. Referral was ordered and the patient was given instructions to contact me if she does not hear from the referring provider within 1 week.           Please note that this dictation was created using voice recognition software. I have made every reasonable attempt to correct obvious errors but there may be errors of grammar and content that I may have overlooked prior to finalization of this note.      Thomas Norwood MD  Physical Medicine and Rehabilitation  Interventional  Spine and Sports Physiatry  [facility]         CC ANA Molina.

## 2023-08-15 NOTE — PROGRESS NOTES
Patient with large breasts causing chronic thoracic back discomfort.  Was seen by physiatry who felt that this is related to poor posture secondary to large breasts.  Thoracic imaging completed, referral to physical therapy placed for the patient.  Referral also to plastics Dr. Sanchez and Dr. Rea placed for the patient.

## 2023-08-23 ENCOUNTER — HOSPITAL ENCOUNTER (OUTPATIENT)
Dept: RADIOLOGY | Facility: MEDICAL CENTER | Age: 43
End: 2023-08-23
Attending: FAMILY MEDICINE
Payer: COMMERCIAL

## 2023-08-23 DIAGNOSIS — M54.6 CHRONIC MIDLINE THORACIC BACK PAIN: ICD-10-CM

## 2023-08-23 DIAGNOSIS — G89.29 CHRONIC MIDLINE THORACIC BACK PAIN: ICD-10-CM

## 2023-08-23 PROCEDURE — 72070 X-RAY EXAM THORAC SPINE 2VWS: CPT

## 2023-10-24 ENCOUNTER — APPOINTMENT (OUTPATIENT)
Dept: PHYSICAL THERAPY | Facility: REHABILITATION | Age: 43
End: 2023-10-24
Attending: FAMILY MEDICINE
Payer: COMMERCIAL

## 2023-10-31 ENCOUNTER — APPOINTMENT (OUTPATIENT)
Dept: PHYSICAL THERAPY | Facility: REHABILITATION | Age: 43
End: 2023-10-31
Attending: FAMILY MEDICINE
Payer: COMMERCIAL

## 2023-11-07 ENCOUNTER — APPOINTMENT (OUTPATIENT)
Dept: PHYSICAL THERAPY | Facility: REHABILITATION | Age: 43
End: 2023-11-07
Attending: FAMILY MEDICINE
Payer: COMMERCIAL

## 2023-11-14 ENCOUNTER — APPOINTMENT (OUTPATIENT)
Dept: PHYSICAL THERAPY | Facility: REHABILITATION | Age: 43
End: 2023-11-14
Attending: FAMILY MEDICINE
Payer: COMMERCIAL

## 2023-11-21 ENCOUNTER — APPOINTMENT (OUTPATIENT)
Dept: PHYSICAL THERAPY | Facility: REHABILITATION | Age: 43
End: 2023-11-21
Attending: FAMILY MEDICINE
Payer: COMMERCIAL

## 2023-11-28 ENCOUNTER — APPOINTMENT (OUTPATIENT)
Dept: PHYSICAL THERAPY | Facility: REHABILITATION | Age: 43
End: 2023-11-28
Attending: FAMILY MEDICINE
Payer: COMMERCIAL

## 2023-12-05 ENCOUNTER — APPOINTMENT (OUTPATIENT)
Dept: PHYSICAL THERAPY | Facility: REHABILITATION | Age: 43
End: 2023-12-05
Attending: FAMILY MEDICINE
Payer: COMMERCIAL

## 2023-12-12 ENCOUNTER — APPOINTMENT (OUTPATIENT)
Dept: PHYSICAL THERAPY | Facility: REHABILITATION | Age: 43
End: 2023-12-12
Attending: FAMILY MEDICINE
Payer: COMMERCIAL

## 2024-05-23 ENCOUNTER — OFFICE VISIT (OUTPATIENT)
Dept: MEDICAL GROUP | Facility: MEDICAL CENTER | Age: 44
End: 2024-05-23
Payer: COMMERCIAL

## 2024-05-23 VITALS
DIASTOLIC BLOOD PRESSURE: 50 MMHG | BODY MASS INDEX: 23.73 KG/M2 | WEIGHT: 151.2 LBS | TEMPERATURE: 99.7 F | HEIGHT: 67 IN | HEART RATE: 88 BPM | RESPIRATION RATE: 16 BRPM | OXYGEN SATURATION: 98 % | SYSTOLIC BLOOD PRESSURE: 94 MMHG

## 2024-05-23 DIAGNOSIS — N75.0 BARTHOLIN GLAND CYST: ICD-10-CM

## 2024-05-23 PROBLEM — K62.89 PERIANAL MASS: Status: ACTIVE | Noted: 2024-05-23

## 2024-05-23 PROCEDURE — 3074F SYST BP LT 130 MM HG: CPT | Performed by: FAMILY MEDICINE

## 2024-05-23 PROCEDURE — 3078F DIAST BP <80 MM HG: CPT | Performed by: FAMILY MEDICINE

## 2024-05-23 PROCEDURE — 99213 OFFICE O/P EST LOW 20 MIN: CPT | Performed by: FAMILY MEDICINE

## 2024-05-23 RX ORDER — BUPROPION HYDROCHLORIDE 150 MG/1
TABLET, EXTENDED RELEASE ORAL
COMMUNITY
Start: 2023-12-24

## 2024-05-23 ASSESSMENT — FIBROSIS 4 INDEX: FIB4 SCORE: 0.61

## 2024-05-23 ASSESSMENT — PATIENT HEALTH QUESTIONNAIRE - PHQ9: CLINICAL INTERPRETATION OF PHQ2 SCORE: 0

## 2024-05-23 NOTE — PROGRESS NOTES
Verbal consent was acquired by the patient to use [employer] listening note generation during this visit:  Yes      Chief complaint::The encounter diagnosis was Bartholin gland cyst.    Assessment and Plan:   The following treatment plan was discussed:     Assessment & Plan  1. Acute Bartholin gland cyst.  Given the current lack of discomfort, the recommended course of action includes sitz baths with Epsom salt soaks. Should the patient experience any tenderness or inflammation at the site, it is recommended that she seek immediate medical attention in the emergency room, as this would necessitate drainage of the gland.    Follow-up  The patient is advised to follow up as necessary.  Alexsandra was seen today for follow-up.    Diagnoses and all orders for this visit:    Bartholin gland cyst    Other orders  -     Cancel: CT-PELVIS WITH; Future  -     Cancel: CBC WITH DIFFERENTIAL; Future        Followup: Return if symptoms worsen or fail to improve.    Subjective/HPI:     HPI:    Alexsandra Villeda is a pleasant 44 y.o. female here for   Chief Complaint   Patient presents with    Follow-Up     Mass growing in peroneum         History of Present Illness  The patient is a 44-year-old female who is here with concerns of a possible mass.    The patient first observed a small mass, approximately the size of her fingertip, in her perineal region a few weeks ago while shaving.  Refrain from intercourse for an extended period time and has recently became sexually active, concerned that this may be a contributing factor. The mass/tenderness resolved within a few days after noticing it on Monday, it then increased in size without the tenderness.  The patient has a history of a previous boil, but asserts that the current mass is deeper. She denies experiencing any pain or discomfort during defecation. The mass was firm earlier this week, but has since softened. She does not have a gynecologist.   Her sister has ovarian  "cancer.    Current Medicines (including changes today)  Current Outpatient Medications   Medication Sig Dispense Refill    buPROPion SR (WELLBUTRIN-SR) 150 MG TABLET SR 12 HR sustained-release tablet       Semaglutide,0.25 or 0.5MG/DOS, 2 MG/3ML Solution Pen-injector Inject 0.5 mg under the skin every 7 days.      LORYNA 3-0.02 MG per tablet Take 1 tablet by mouth every day.       No current facility-administered medications for this visit.     Past Medical/ Surgical History  She  has a past medical history of Anemia, Anxiety, Asthma, Major depressive disorder with current active episode (7/8/2021), Mixed hyperlipidemia (7/13/2023), and Tobacco use.    She has no past medical history of Arrhythmia, Arthritis, Asthma, Cancer (HCC), Clotting disorder (HCC), COPD (chronic obstructive pulmonary disease) ([facility]), Diabetes (HCC), GERD (gastroesophageal reflux disease), Heart attack (HCC), Heart murmur, HIV (human immunodeficiency virus infection) ([facility]), Hypertension, Kidney disease, Migraine, Osteoporosis, Pulmonary emphysema (HCC), Seizure (HCC), Stroke (HCC), Substance abuse (HCC), or Thyroid disease.  She  has a past surgical history that includes primary c section and primary c section.       Objective:   BP 94/50 (BP Location: Right arm, Patient Position: Sitting, BP Cuff Size: Adult)   Pulse 88   Temp 37.6 °C (99.7 °F) (Temporal)   Resp 16   Ht 1.702 m (5' 7\")   Wt 68.6 kg (151 lb 3.2 oz)   SpO2 98%  Body mass index is 23.68 kg/m².    Physical exam was made by observation   Physical Exam  Constitutional:       General: She is not in acute distress.  HENT:      Head: Normocephalic and atraumatic.   Eyes:      Conjunctiva/sclera: Conjunctivae normal.      Pupils: Pupils are equal, round, and reactive to light.   Pulmonary:      Effort: Pulmonary effort is normal. No respiratory distress.   Abdominal:      General: There is no distension.   Genitourinary:      Musculoskeletal:      Cervical back: Normal range of " motion and neck supple.   Skin:     General: Skin is warm and dry.      Findings: No rash.   Neurological:      Mental Status: She is alert and oriented to person, place, and time.      Gait: Gait is intact.   Psychiatric:         Mood and Affect: Affect normal.          Lab/ Imaging Results:  No labs or imaging to review    Please note that this dictation was created using voice recognition software. I have made every reasonable attempt to correct obvious errors, but I expect that there are errors of grammar and possibly content that I did not discover before finalizing the note.

## 2024-08-15 ENCOUNTER — APPOINTMENT (OUTPATIENT)
Dept: MEDICAL GROUP | Facility: MEDICAL CENTER | Age: 44
End: 2024-08-15
Payer: COMMERCIAL

## 2024-09-04 ENCOUNTER — APPOINTMENT (OUTPATIENT)
Dept: RADIOLOGY | Facility: MEDICAL CENTER | Age: 44
End: 2024-09-04
Attending: FAMILY MEDICINE
Payer: COMMERCIAL

## 2024-09-04 DIAGNOSIS — Z12.31 VISIT FOR SCREENING MAMMOGRAM: ICD-10-CM

## 2025-08-27 ENCOUNTER — APPOINTMENT (OUTPATIENT)
Dept: RADIOLOGY | Facility: MEDICAL CENTER | Age: 45
End: 2025-08-27
Attending: FAMILY MEDICINE
Payer: COMMERCIAL